# Patient Record
Sex: FEMALE | Race: WHITE | NOT HISPANIC OR LATINO | Employment: OTHER | URBAN - METROPOLITAN AREA
[De-identification: names, ages, dates, MRNs, and addresses within clinical notes are randomized per-mention and may not be internally consistent; named-entity substitution may affect disease eponyms.]

---

## 2017-11-09 ENCOUNTER — PATIENT OUTREACH (OUTPATIENT)
Dept: HEALTH INFORMATION MANAGEMENT | Facility: OTHER | Age: 67
End: 2017-11-09

## 2018-04-25 PROBLEM — E66.9 OBESITY (BMI 30-39.9): Status: ACTIVE | Noted: 2018-04-25

## 2020-09-11 ENCOUNTER — HOSPITAL ENCOUNTER (OUTPATIENT)
Dept: RADIOLOGY | Facility: MEDICAL CENTER | Age: 70
End: 2020-09-11

## 2021-01-01 ENCOUNTER — HOSPICE ADMISSION (OUTPATIENT)
Dept: HOSPICE | Facility: HOSPICE | Age: 71
End: 2021-01-01
Payer: MEDICARE

## 2021-01-01 ENCOUNTER — HOSPITAL ENCOUNTER (INPATIENT)
Facility: MEDICAL CENTER | Age: 71
LOS: 4 days | DRG: 177 | End: 2021-09-23
Attending: STUDENT IN AN ORGANIZED HEALTH CARE EDUCATION/TRAINING PROGRAM | Admitting: HOSPITALIST
Payer: MEDICARE

## 2021-01-01 VITALS
OXYGEN SATURATION: 62 % | SYSTOLIC BLOOD PRESSURE: 87 MMHG | DIASTOLIC BLOOD PRESSURE: 49 MMHG | RESPIRATION RATE: 20 BRPM | WEIGHT: 149.91 LBS | BODY MASS INDEX: 26.56 KG/M2 | HEART RATE: 105 BPM | TEMPERATURE: 97.5 F | HEIGHT: 63 IN

## 2021-01-01 LAB
ALBUMIN SERPL BCP-MCNC: 1.6 G/DL (ref 3.2–4.9)
ALBUMIN SERPL BCP-MCNC: 2.5 G/DL (ref 3.2–4.9)
ALBUMIN/GLOB SERPL: 0.5 G/DL
ALBUMIN/GLOB SERPL: 0.8 G/DL
ALP SERPL-CCNC: 35 U/L (ref 30–99)
ALP SERPL-CCNC: 41 U/L (ref 30–99)
ALT SERPL-CCNC: 14 U/L (ref 2–50)
ALT SERPL-CCNC: 16 U/L (ref 2–50)
ANION GAP SERPL CALC-SCNC: 14 MMOL/L (ref 7–16)
ANION GAP SERPL CALC-SCNC: 15 MMOL/L (ref 7–16)
ANION GAP SERPL CALC-SCNC: 16 MMOL/L (ref 7–16)
ANION GAP SERPL CALC-SCNC: 16 MMOL/L (ref 7–16)
AST SERPL-CCNC: 23 U/L (ref 12–45)
AST SERPL-CCNC: 26 U/L (ref 12–45)
BASE EXCESS BLDA CALC-SCNC: -2 MMOL/L (ref -4–3)
BILIRUB SERPL-MCNC: 0.3 MG/DL (ref 0.1–1.5)
BILIRUB SERPL-MCNC: 0.5 MG/DL (ref 0.1–1.5)
BODY TEMPERATURE: ABNORMAL DEGREES
BUN SERPL-MCNC: 48 MG/DL (ref 8–22)
BUN SERPL-MCNC: 51 MG/DL (ref 8–22)
BUN SERPL-MCNC: 54 MG/DL (ref 8–22)
BUN SERPL-MCNC: 61 MG/DL (ref 8–22)
CA-I BLD ISE-SCNC: 1.21 MMOL/L (ref 1.1–1.3)
CALCIUM SERPL-MCNC: 7.3 MG/DL (ref 8.4–10.2)
CALCIUM SERPL-MCNC: 8.5 MG/DL (ref 8.4–10.2)
CALCIUM SERPL-MCNC: 8.5 MG/DL (ref 8.4–10.2)
CALCIUM SERPL-MCNC: 8.7 MG/DL (ref 8.4–10.2)
CHLORIDE SERPL-SCNC: 102 MMOL/L (ref 96–112)
CHLORIDE SERPL-SCNC: 103 MMOL/L (ref 96–112)
CHLORIDE SERPL-SCNC: 107 MMOL/L (ref 96–112)
CHLORIDE SERPL-SCNC: 108 MMOL/L (ref 96–112)
CO2 BLDA-SCNC: 21 MMOL/L (ref 20–33)
CO2 SERPL-SCNC: 14 MMOL/L (ref 20–33)
CO2 SERPL-SCNC: 18 MMOL/L (ref 20–33)
CO2 SERPL-SCNC: 19 MMOL/L (ref 20–33)
CO2 SERPL-SCNC: 20 MMOL/L (ref 20–33)
CREAT SERPL-MCNC: 1 MG/DL (ref 0.5–1.4)
CREAT SERPL-MCNC: 1.05 MG/DL (ref 0.5–1.4)
CREAT SERPL-MCNC: 1.21 MG/DL (ref 0.5–1.4)
CREAT SERPL-MCNC: 1.7 MG/DL (ref 0.5–1.4)
CRP SERPL HS-MCNC: 14.09 MG/DL (ref 0–0.75)
D DIMER PPP IA.FEU-MCNC: >20 UG/ML (FEU) (ref 0–0.5)
ERYTHROCYTE [DISTWIDTH] IN BLOOD BY AUTOMATED COUNT: 48.3 FL (ref 35.9–50)
GLOBULIN SER CALC-MCNC: 3.1 G/DL (ref 1.9–3.5)
GLOBULIN SER CALC-MCNC: 3.2 G/DL (ref 1.9–3.5)
GLUCOSE BLD-MCNC: 108 MG/DL (ref 65–99)
GLUCOSE BLD-MCNC: 124 MG/DL (ref 65–99)
GLUCOSE BLD-MCNC: 136 MG/DL (ref 65–99)
GLUCOSE BLD-MCNC: 141 MG/DL (ref 65–99)
GLUCOSE BLD-MCNC: 156 MG/DL (ref 65–99)
GLUCOSE BLD-MCNC: 158 MG/DL (ref 65–99)
GLUCOSE BLD-MCNC: 159 MG/DL (ref 65–99)
GLUCOSE BLD-MCNC: 167 MG/DL (ref 65–99)
GLUCOSE BLD-MCNC: 177 MG/DL (ref 65–99)
GLUCOSE BLD-MCNC: 180 MG/DL (ref 65–99)
GLUCOSE BLD-MCNC: 184 MG/DL (ref 65–99)
GLUCOSE BLD-MCNC: 185 MG/DL (ref 65–99)
GLUCOSE BLD-MCNC: 217 MG/DL (ref 65–99)
GLUCOSE BLD-MCNC: 235 MG/DL (ref 65–99)
GLUCOSE BLD-MCNC: 256 MG/DL (ref 65–99)
GLUCOSE BLD-MCNC: 284 MG/DL (ref 65–99)
GLUCOSE BLD-MCNC: 287 MG/DL (ref 65–99)
GLUCOSE BLD-MCNC: 320 MG/DL (ref 65–99)
GLUCOSE BLD-MCNC: 62 MG/DL (ref 65–99)
GLUCOSE BLD-MCNC: 68 MG/DL (ref 65–99)
GLUCOSE BLD-MCNC: 71 MG/DL (ref 65–99)
GLUCOSE BLD-MCNC: 75 MG/DL (ref 65–99)
GLUCOSE BLD-MCNC: 87 MG/DL (ref 65–99)
GLUCOSE BLD-MCNC: 95 MG/DL (ref 65–99)
GLUCOSE SERPL-MCNC: 137 MG/DL (ref 65–99)
GLUCOSE SERPL-MCNC: 162 MG/DL (ref 65–99)
GLUCOSE SERPL-MCNC: 325 MG/DL (ref 65–99)
GLUCOSE SERPL-MCNC: 55 MG/DL (ref 65–99)
HCO3 BLDA-SCNC: 20.5 MMOL/L (ref 17–25)
HCT VFR BLD AUTO: 38.1 % (ref 37–47)
HCT VFR BLD CALC: 37 % (ref 37–47)
HGB BLD-MCNC: 12.2 G/DL (ref 12–16)
HGB BLD-MCNC: 12.6 G/DL (ref 12–16)
MCH RBC QN AUTO: 29.5 PG (ref 27–33)
MCHC RBC AUTO-ENTMCNC: 32 G/DL (ref 33.6–35)
MCV RBC AUTO: 92 FL (ref 81.4–97.8)
PCO2 BLDA: 29 MMHG (ref 26–37)
PH BLDA: 7.46 [PH] (ref 7.4–7.5)
PH TEMP ADJ BLDA: 7.47 [PH] (ref 7.4–7.5)
PLATELET # BLD AUTO: 132 K/UL (ref 164–446)
PMV BLD AUTO: 10.5 FL (ref 9–12.9)
PO2 BLDA: 52 MMHG (ref 64–87)
PO2 TEMP ADJ BLDA: 50 MMHG (ref 64–87)
POTASSIUM BLD-SCNC: 3.9 MMOL/L (ref 3.6–5.5)
POTASSIUM SERPL-SCNC: 4 MMOL/L (ref 3.6–5.5)
POTASSIUM SERPL-SCNC: 4.4 MMOL/L (ref 3.6–5.5)
POTASSIUM SERPL-SCNC: 4.9 MMOL/L (ref 3.6–5.5)
POTASSIUM SERPL-SCNC: 5.2 MMOL/L (ref 3.6–5.5)
PROCALCITONIN SERPL-MCNC: 0.09 NG/ML
PROCALCITONIN SERPL-MCNC: 0.1 NG/ML
PROT SERPL-MCNC: 4.7 G/DL (ref 6–8.2)
PROT SERPL-MCNC: 5.7 G/DL (ref 6–8.2)
RBC # BLD AUTO: 4.14 M/UL (ref 4.2–5.4)
SAO2 % BLDA: 89 % (ref 93–99)
SODIUM BLD-SCNC: 139 MMOL/L (ref 135–145)
SODIUM SERPL-SCNC: 135 MMOL/L (ref 135–145)
SODIUM SERPL-SCNC: 137 MMOL/L (ref 135–145)
SODIUM SERPL-SCNC: 138 MMOL/L (ref 135–145)
SODIUM SERPL-SCNC: 142 MMOL/L (ref 135–145)
SPECIMEN DRAWN FROM PATIENT: ABNORMAL
WBC # BLD AUTO: 5.4 K/UL (ref 4.8–10.8)

## 2021-01-01 PROCEDURE — 700102 HCHG RX REV CODE 250 W/ 637 OVERRIDE(OP): Performed by: HOSPITALIST

## 2021-01-01 PROCEDURE — 700111 HCHG RX REV CODE 636 W/ 250 OVERRIDE (IP): Performed by: STUDENT IN AN ORGANIZED HEALTH CARE EDUCATION/TRAINING PROGRAM

## 2021-01-01 PROCEDURE — 700102 HCHG RX REV CODE 250 W/ 637 OVERRIDE(OP): Performed by: STUDENT IN AN ORGANIZED HEALTH CARE EDUCATION/TRAINING PROGRAM

## 2021-01-01 PROCEDURE — 94669 MECHANICAL CHEST WALL OSCILL: CPT

## 2021-01-01 PROCEDURE — 80048 BASIC METABOLIC PNL TOTAL CA: CPT

## 2021-01-01 PROCEDURE — A9270 NON-COVERED ITEM OR SERVICE: HCPCS | Performed by: STUDENT IN AN ORGANIZED HEALTH CARE EDUCATION/TRAINING PROGRAM

## 2021-01-01 PROCEDURE — 94660 CPAP INITIATION&MGMT: CPT

## 2021-01-01 PROCEDURE — 99223 1ST HOSP IP/OBS HIGH 75: CPT | Performed by: HOSPITALIST

## 2021-01-01 PROCEDURE — 700111 HCHG RX REV CODE 636 W/ 250 OVERRIDE (IP): Performed by: HOSPITALIST

## 2021-01-01 PROCEDURE — 80053 COMPREHEN METABOLIC PANEL: CPT

## 2021-01-01 PROCEDURE — 94760 N-INVAS EAR/PLS OXIMETRY 1: CPT

## 2021-01-01 PROCEDURE — 86140 C-REACTIVE PROTEIN: CPT

## 2021-01-01 PROCEDURE — 99291 CRITICAL CARE FIRST HOUR: CPT | Performed by: HOSPITALIST

## 2021-01-01 PROCEDURE — 94640 AIRWAY INHALATION TREATMENT: CPT

## 2021-01-01 PROCEDURE — 85027 COMPLETE CBC AUTOMATED: CPT

## 2021-01-01 PROCEDURE — 36600 WITHDRAWAL OF ARTERIAL BLOOD: CPT

## 2021-01-01 PROCEDURE — 700101 HCHG RX REV CODE 250: Performed by: STUDENT IN AN ORGANIZED HEALTH CARE EDUCATION/TRAINING PROGRAM

## 2021-01-01 PROCEDURE — 770001 HCHG ROOM/CARE - MED/SURG/GYN PRIV*

## 2021-01-01 PROCEDURE — 700101 HCHG RX REV CODE 250: Performed by: INTERNAL MEDICINE

## 2021-01-01 PROCEDURE — 700101 HCHG RX REV CODE 250: Performed by: HOSPITALIST

## 2021-01-01 PROCEDURE — A9270 NON-COVERED ITEM OR SERVICE: HCPCS | Performed by: HOSPITALIST

## 2021-01-01 PROCEDURE — 82962 GLUCOSE BLOOD TEST: CPT

## 2021-01-01 PROCEDURE — 99291 CRITICAL CARE FIRST HOUR: CPT | Performed by: STUDENT IN AN ORGANIZED HEALTH CARE EDUCATION/TRAINING PROGRAM

## 2021-01-01 PROCEDURE — 700105 HCHG RX REV CODE 258: Performed by: INTERNAL MEDICINE

## 2021-01-01 PROCEDURE — 84295 ASSAY OF SERUM SODIUM: CPT

## 2021-01-01 PROCEDURE — 82962 GLUCOSE BLOOD TEST: CPT | Mod: 91

## 2021-01-01 PROCEDURE — 85379 FIBRIN DEGRADATION QUANT: CPT

## 2021-01-01 PROCEDURE — XW033E5 INTRODUCTION OF REMDESIVIR ANTI-INFECTIVE INTO PERIPHERAL VEIN, PERCUTANEOUS APPROACH, NEW TECHNOLOGY GROUP 5: ICD-10-PCS | Performed by: INTERNAL MEDICINE

## 2021-01-01 PROCEDURE — 97165 OT EVAL LOW COMPLEX 30 MIN: CPT

## 2021-01-01 PROCEDURE — 84132 ASSAY OF SERUM POTASSIUM: CPT

## 2021-01-01 PROCEDURE — 82330 ASSAY OF CALCIUM: CPT

## 2021-01-01 PROCEDURE — 82803 BLOOD GASES ANY COMBINATION: CPT

## 2021-01-01 PROCEDURE — 8E0ZXY6 ISOLATION: ICD-10-PCS | Performed by: HOSPITALIST

## 2021-01-01 PROCEDURE — 84145 PROCALCITONIN (PCT): CPT

## 2021-01-01 PROCEDURE — 5A0945A ASSISTANCE WITH RESPIRATORY VENTILATION, 24-96 CONSECUTIVE HOURS, HIGH NASAL FLOW/VELOCITY: ICD-10-PCS | Performed by: HOSPITALIST

## 2021-01-01 PROCEDURE — 99233 SBSQ HOSP IP/OBS HIGH 50: CPT | Performed by: STUDENT IN AN ORGANIZED HEALTH CARE EDUCATION/TRAINING PROGRAM

## 2021-01-01 PROCEDURE — 85014 HEMATOCRIT: CPT

## 2021-01-01 RX ORDER — POLYETHYLENE GLYCOL 3350 17 G/17G
1 POWDER, FOR SOLUTION ORAL
Status: DISCONTINUED | OUTPATIENT
Start: 2021-01-01 | End: 2021-01-01

## 2021-01-01 RX ORDER — DEXTROSE MONOHYDRATE 25 G/50ML
50 INJECTION, SOLUTION INTRAVENOUS
Status: DISCONTINUED | OUTPATIENT
Start: 2021-01-01 | End: 2021-01-01

## 2021-01-01 RX ORDER — AMOXICILLIN 250 MG
2 CAPSULE ORAL 2 TIMES DAILY
Status: DISCONTINUED | OUTPATIENT
Start: 2021-01-01 | End: 2021-01-01

## 2021-01-01 RX ORDER — ATORVASTATIN CALCIUM 10 MG/1
10 TABLET, FILM COATED ORAL EVERY EVENING
Status: DISCONTINUED | OUTPATIENT
Start: 2021-01-01 | End: 2021-01-01

## 2021-01-01 RX ORDER — LIDOCAINE 50 MG/G
1 PATCH TOPICAL EVERY 24 HOURS
Status: DISCONTINUED | OUTPATIENT
Start: 2021-01-01 | End: 2021-01-01

## 2021-01-01 RX ORDER — ATORVASTATIN CALCIUM 10 MG/1
10 TABLET, FILM COATED ORAL
Status: DISCONTINUED | OUTPATIENT
Start: 2021-01-01 | End: 2021-01-01

## 2021-01-01 RX ORDER — LISINOPRIL 5 MG/1
5 TABLET ORAL
Status: DISCONTINUED | OUTPATIENT
Start: 2021-01-01 | End: 2021-01-01

## 2021-01-01 RX ORDER — DEXAMETHASONE 4 MG/1
6 TABLET ORAL DAILY
Status: DISCONTINUED | OUTPATIENT
Start: 2021-01-01 | End: 2021-01-01

## 2021-01-01 RX ORDER — LORAZEPAM 2 MG/ML
1 INJECTION INTRAMUSCULAR
Status: DISCONTINUED | OUTPATIENT
Start: 2021-01-01 | End: 2021-09-24 | Stop reason: HOSPADM

## 2021-01-01 RX ORDER — MORPHINE SULFATE 10 MG/ML
5 INJECTION, SOLUTION INTRAMUSCULAR; INTRAVENOUS
Status: DISCONTINUED | OUTPATIENT
Start: 2021-01-01 | End: 2021-09-24 | Stop reason: HOSPADM

## 2021-01-01 RX ORDER — OXYCODONE HYDROCHLORIDE AND ACETAMINOPHEN 5; 325 MG/1; MG/1
2 TABLET ORAL EVERY 6 HOURS PRN
Status: DISCONTINUED | OUTPATIENT
Start: 2021-01-01 | End: 2021-01-01

## 2021-01-01 RX ORDER — LEVOTHYROXINE SODIUM 112 UG/1
112 TABLET ORAL
Status: DISCONTINUED | OUTPATIENT
Start: 2021-01-01 | End: 2021-01-01

## 2021-01-01 RX ORDER — OMEPRAZOLE 20 MG/1
20 CAPSULE, DELAYED RELEASE ORAL DAILY
Status: DISCONTINUED | OUTPATIENT
Start: 2021-01-01 | End: 2021-01-01

## 2021-01-01 RX ORDER — ACETAMINOPHEN 325 MG/1
650 TABLET ORAL EVERY 4 HOURS PRN
Status: DISCONTINUED | OUTPATIENT
Start: 2021-01-01 | End: 2021-09-24 | Stop reason: HOSPADM

## 2021-01-01 RX ORDER — GABAPENTIN 100 MG/1
100 CAPSULE ORAL 3 TIMES DAILY PRN
COMMUNITY

## 2021-01-01 RX ORDER — ACETAMINOPHEN 650 MG/1
650 SUPPOSITORY RECTAL EVERY 4 HOURS PRN
Status: DISCONTINUED | OUTPATIENT
Start: 2021-01-01 | End: 2021-09-24 | Stop reason: HOSPADM

## 2021-01-01 RX ORDER — BISACODYL 10 MG
10 SUPPOSITORY, RECTAL RECTAL
Status: DISCONTINUED | OUTPATIENT
Start: 2021-01-01 | End: 2021-01-01

## 2021-01-01 RX ORDER — DOCUSATE SODIUM 100 MG/1
100 CAPSULE, LIQUID FILLED ORAL EVERY 12 HOURS
Status: DISCONTINUED | OUTPATIENT
Start: 2021-01-01 | End: 2021-09-24 | Stop reason: HOSPADM

## 2021-01-01 RX ORDER — OXYCODONE AND ACETAMINOPHEN 10; 325 MG/1; MG/1
1 TABLET ORAL EVERY 4 HOURS PRN
Status: ON HOLD | COMMUNITY
End: 2021-01-01

## 2021-01-01 RX ORDER — LORAZEPAM 2 MG/ML
1 CONCENTRATE ORAL
Status: DISCONTINUED | OUTPATIENT
Start: 2021-01-01 | End: 2021-09-24 | Stop reason: HOSPADM

## 2021-01-01 RX ORDER — OXYCODONE HYDROCHLORIDE 10 MG/1
10 TABLET ORAL 2 TIMES DAILY PRN
COMMUNITY

## 2021-01-01 RX ORDER — MORPHINE SULFATE 10 MG/ML
10 INJECTION, SOLUTION INTRAMUSCULAR; INTRAVENOUS
Status: DISCONTINUED | OUTPATIENT
Start: 2021-01-01 | End: 2021-09-24 | Stop reason: HOSPADM

## 2021-01-01 RX ORDER — SCOLOPAMINE TRANSDERMAL SYSTEM 1 MG/1
1 PATCH, EXTENDED RELEASE TRANSDERMAL
Status: DISCONTINUED | OUTPATIENT
Start: 2021-01-01 | End: 2021-09-24 | Stop reason: HOSPADM

## 2021-01-01 RX ORDER — TACROLIMUS 1 MG/1
2 CAPSULE ORAL EVERY MORNING
Status: DISCONTINUED | OUTPATIENT
Start: 2021-01-01 | End: 2021-01-01

## 2021-01-01 RX ORDER — ONDANSETRON 2 MG/ML
4 INJECTION INTRAMUSCULAR; INTRAVENOUS EVERY 4 HOURS PRN
Status: DISCONTINUED | OUTPATIENT
Start: 2021-01-01 | End: 2021-01-01

## 2021-01-01 RX ORDER — ONDANSETRON 2 MG/ML
8 INJECTION INTRAMUSCULAR; INTRAVENOUS EVERY 8 HOURS PRN
Status: DISCONTINUED | OUTPATIENT
Start: 2021-01-01 | End: 2021-09-24 | Stop reason: HOSPADM

## 2021-01-01 RX ORDER — COVID-19 ANTIGEN TEST
220 KIT MISCELLANEOUS DAILY
COMMUNITY

## 2021-01-01 RX ORDER — MYCOPHENOLATE MOFETIL 250 MG/1
500 CAPSULE ORAL 2 TIMES DAILY
Status: DISCONTINUED | OUTPATIENT
Start: 2021-01-01 | End: 2021-01-01

## 2021-01-01 RX ORDER — TACROLIMUS 1 MG/1
1 CAPSULE ORAL EVERY EVENING
Status: DISCONTINUED | OUTPATIENT
Start: 2021-01-01 | End: 2021-01-01

## 2021-01-01 RX ORDER — ATROPINE SULFATE 10 MG/ML
2 SOLUTION/ DROPS OPHTHALMIC EVERY 4 HOURS PRN
Status: DISCONTINUED | OUTPATIENT
Start: 2021-01-01 | End: 2021-09-24 | Stop reason: HOSPADM

## 2021-01-01 RX ORDER — FUROSEMIDE 10 MG/ML
20 INJECTION INTRAMUSCULAR; INTRAVENOUS
Status: DISCONTINUED | OUTPATIENT
Start: 2021-01-01 | End: 2021-01-01

## 2021-01-01 RX ORDER — POLYVINYL ALCOHOL 14 MG/ML
2 SOLUTION/ DROPS OPHTHALMIC EVERY 6 HOURS PRN
Status: DISCONTINUED | OUTPATIENT
Start: 2021-01-01 | End: 2021-09-24 | Stop reason: HOSPADM

## 2021-01-01 RX ORDER — ONDANSETRON 4 MG/1
4 TABLET, ORALLY DISINTEGRATING ORAL EVERY 4 HOURS PRN
Status: DISCONTINUED | OUTPATIENT
Start: 2021-01-01 | End: 2021-01-01

## 2021-01-01 RX ORDER — ONDANSETRON 4 MG/1
8 TABLET, ORALLY DISINTEGRATING ORAL EVERY 8 HOURS PRN
Status: DISCONTINUED | OUTPATIENT
Start: 2021-01-01 | End: 2021-09-24 | Stop reason: HOSPADM

## 2021-01-01 RX ORDER — LISINOPRIL 5 MG/1
2.5 TABLET ORAL
Status: DISCONTINUED | OUTPATIENT
Start: 2021-01-01 | End: 2021-01-01

## 2021-01-01 RX ORDER — ACETAMINOPHEN 325 MG/1
650 TABLET ORAL EVERY 6 HOURS PRN
Status: DISCONTINUED | OUTPATIENT
Start: 2021-01-01 | End: 2021-01-01

## 2021-01-01 RX ORDER — TACROLIMUS 1 MG/1
1 CAPSULE ORAL 2 TIMES DAILY
Status: DISCONTINUED | OUTPATIENT
Start: 2021-01-01 | End: 2021-01-01

## 2021-01-01 RX ORDER — PREDNISONE 5 MG/1
5 TABLET ORAL EVERY EVENING
COMMUNITY

## 2021-01-01 RX ADMIN — OMEPRAZOLE 20 MG: 20 CAPSULE, DELAYED RELEASE ORAL at 05:00

## 2021-01-01 RX ADMIN — INSULIN HUMAN 7 UNITS: 100 INJECTION, SOLUTION PARENTERAL at 12:22

## 2021-01-01 RX ADMIN — MYCOPHENOLATE MOFETIL 500 MG: 250 CAPSULE ORAL at 06:07

## 2021-01-01 RX ADMIN — OXYCODONE AND ACETAMINOPHEN 2 TABLET: 5; 325 TABLET ORAL at 04:26

## 2021-01-01 RX ADMIN — INSULIN HUMAN 3 UNITS: 100 INJECTION, SOLUTION PARENTERAL at 17:33

## 2021-01-01 RX ADMIN — FUROSEMIDE 20 MG: 10 INJECTION, SOLUTION INTRAMUSCULAR; INTRAVENOUS at 06:12

## 2021-01-01 RX ADMIN — TACROLIMUS 1 MG: 1 CAPSULE ORAL at 18:06

## 2021-01-01 RX ADMIN — OXYCODONE AND ACETAMINOPHEN 2 TABLET: 5; 325 TABLET ORAL at 04:58

## 2021-01-01 RX ADMIN — ATORVASTATIN CALCIUM 10 MG: 10 TABLET, FILM COATED ORAL at 18:06

## 2021-01-01 RX ADMIN — REMDESIVIR 200 MG: 100 INJECTION, POWDER, LYOPHILIZED, FOR SOLUTION INTRAVENOUS at 15:58

## 2021-01-01 RX ADMIN — APIXABAN 5 MG: 5 TABLET, FILM COATED ORAL at 18:03

## 2021-01-01 RX ADMIN — FUROSEMIDE 20 MG: 10 INJECTION, SOLUTION INTRAMUSCULAR; INTRAVENOUS at 05:00

## 2021-01-01 RX ADMIN — APIXABAN 5 MG: 5 TABLET, FILM COATED ORAL at 05:33

## 2021-01-01 RX ADMIN — INSULIN HUMAN 4 UNITS: 100 INJECTION, SOLUTION PARENTERAL at 21:01

## 2021-01-01 RX ADMIN — DEXTROSE MONOHYDRATE 50 ML: 25 INJECTION, SOLUTION INTRAVENOUS at 06:44

## 2021-01-01 RX ADMIN — LORAZEPAM 1 MG: 2 INJECTION INTRAMUSCULAR; INTRAVENOUS at 16:31

## 2021-01-01 RX ADMIN — INSULIN HUMAN 7 UNITS: 100 INJECTION, SOLUTION PARENTERAL at 12:20

## 2021-01-01 RX ADMIN — TACROLIMUS 1 MG: 1 CAPSULE ORAL at 18:05

## 2021-01-01 RX ADMIN — SCOPOLAMINE 1 PATCH: 1.5 PATCH, EXTENDED RELEASE TRANSDERMAL at 16:32

## 2021-01-01 RX ADMIN — OMEPRAZOLE 20 MG: 20 CAPSULE, DELAYED RELEASE ORAL at 05:33

## 2021-01-01 RX ADMIN — TACROLIMUS 2 MG: 1 CAPSULE ORAL at 05:33

## 2021-01-01 RX ADMIN — DEXTROSE MONOHYDRATE 50 ML: 25 INJECTION, SOLUTION INTRAVENOUS at 06:01

## 2021-01-01 RX ADMIN — OXYCODONE AND ACETAMINOPHEN 2 TABLET: 5; 325 TABLET ORAL at 18:04

## 2021-01-01 RX ADMIN — APIXABAN 5 MG: 5 TABLET, FILM COATED ORAL at 06:07

## 2021-01-01 RX ADMIN — LEVOTHYROXINE SODIUM 112 MCG: 0.11 TABLET ORAL at 05:34

## 2021-01-01 RX ADMIN — MYCOPHENOLATE MOFETIL 500 MG: 250 CAPSULE ORAL at 06:13

## 2021-01-01 RX ADMIN — ATORVASTATIN CALCIUM 10 MG: 10 TABLET, FILM COATED ORAL at 18:05

## 2021-01-01 RX ADMIN — OXYCODONE AND ACETAMINOPHEN 2 TABLET: 5; 325 TABLET ORAL at 12:48

## 2021-01-01 RX ADMIN — ONDANSETRON 4 MG: 2 INJECTION INTRAMUSCULAR; INTRAVENOUS at 12:22

## 2021-01-01 RX ADMIN — LIDOCAINE 1 PATCH: 50 PATCH TOPICAL at 03:30

## 2021-01-01 RX ADMIN — INSULIN HUMAN 3 UNITS: 100 INJECTION, SOLUTION PARENTERAL at 06:31

## 2021-01-01 RX ADMIN — MYCOPHENOLATE MOFETIL 500 MG: 250 CAPSULE ORAL at 18:04

## 2021-01-01 RX ADMIN — OXYCODONE AND ACETAMINOPHEN 2 TABLET: 5; 325 TABLET ORAL at 14:09

## 2021-01-01 RX ADMIN — INSULIN HUMAN 3 UNITS: 100 INJECTION, SOLUTION PARENTERAL at 06:10

## 2021-01-01 RX ADMIN — FUROSEMIDE 20 MG: 10 INJECTION, SOLUTION INTRAMUSCULAR; INTRAVENOUS at 18:04

## 2021-01-01 RX ADMIN — TACROLIMUS 2 MG: 1 CAPSULE ORAL at 05:00

## 2021-01-01 RX ADMIN — TACROLIMUS 1 MG: 1 CAPSULE ORAL at 17:26

## 2021-01-01 RX ADMIN — LISINOPRIL 5 MG: 5 TABLET ORAL at 10:43

## 2021-01-01 RX ADMIN — TACROLIMUS 2 MG: 1 CAPSULE ORAL at 06:13

## 2021-01-01 RX ADMIN — LEVOTHYROXINE SODIUM 112 MCG: 0.11 TABLET ORAL at 06:07

## 2021-01-01 RX ADMIN — LEVOTHYROXINE SODIUM 112 MCG: 0.11 TABLET ORAL at 06:14

## 2021-01-01 RX ADMIN — APIXABAN 5 MG: 5 TABLET, FILM COATED ORAL at 18:06

## 2021-01-01 RX ADMIN — ATORVASTATIN CALCIUM 10 MG: 10 TABLET, FILM COATED ORAL at 17:26

## 2021-01-01 RX ADMIN — OXYCODONE AND ACETAMINOPHEN 2 TABLET: 5; 325 TABLET ORAL at 03:03

## 2021-01-01 RX ADMIN — LIDOCAINE 1 PATCH: 50 PATCH TOPICAL at 03:03

## 2021-01-01 RX ADMIN — LIDOCAINE 1 PATCH: 50 PATCH TOPICAL at 04:59

## 2021-01-01 RX ADMIN — ATORVASTATIN CALCIUM 10 MG: 10 TABLET, FILM COATED ORAL at 18:03

## 2021-01-01 RX ADMIN — FUROSEMIDE 20 MG: 10 INJECTION, SOLUTION INTRAMUSCULAR; INTRAVENOUS at 15:58

## 2021-01-01 RX ADMIN — MYCOPHENOLATE MOFETIL 500 MG: 250 CAPSULE ORAL at 17:26

## 2021-01-01 RX ADMIN — LIDOCAINE 1 PATCH: 50 PATCH TOPICAL at 04:34

## 2021-01-01 RX ADMIN — LEVOTHYROXINE SODIUM 112 MCG: 0.11 TABLET ORAL at 10:43

## 2021-01-01 RX ADMIN — OMEPRAZOLE 20 MG: 20 CAPSULE, DELAYED RELEASE ORAL at 06:07

## 2021-01-01 RX ADMIN — INSULIN HUMAN 3 UNITS: 100 INJECTION, SOLUTION PARENTERAL at 11:49

## 2021-01-01 RX ADMIN — MYCOPHENOLATE MOFETIL 500 MG: 250 CAPSULE ORAL at 06:08

## 2021-01-01 RX ADMIN — OXYCODONE AND ACETAMINOPHEN 2 TABLET: 5; 325 TABLET ORAL at 08:17

## 2021-01-01 RX ADMIN — OXYCODONE AND ACETAMINOPHEN 2 TABLET: 5; 325 TABLET ORAL at 00:54

## 2021-01-01 RX ADMIN — SENNOSIDES AND DOCUSATE SODIUM 2 TABLET: 50; 8.6 TABLET ORAL at 06:06

## 2021-01-01 RX ADMIN — APIXABAN 5 MG: 5 TABLET, FILM COATED ORAL at 06:13

## 2021-01-01 RX ADMIN — APIXABAN 5 MG: 5 TABLET, FILM COATED ORAL at 10:43

## 2021-01-01 RX ADMIN — FUROSEMIDE 20 MG: 10 INJECTION, SOLUTION INTRAMUSCULAR; INTRAVENOUS at 10:43

## 2021-01-01 RX ADMIN — MORPHINE SULFATE 10 MG: 10 INJECTION INTRAVENOUS at 16:31

## 2021-01-01 RX ADMIN — TACROLIMUS 1 MG: 1 CAPSULE ORAL at 06:08

## 2021-01-01 RX ADMIN — FUROSEMIDE 20 MG: 10 INJECTION, SOLUTION INTRAMUSCULAR; INTRAVENOUS at 16:53

## 2021-01-01 RX ADMIN — INSULIN HUMAN 3 UNITS: 100 INJECTION, SOLUTION PARENTERAL at 11:42

## 2021-01-01 RX ADMIN — MYCOPHENOLATE MOFETIL 500 MG: 250 CAPSULE ORAL at 05:00

## 2021-01-01 RX ADMIN — MYCOPHENOLATE MOFETIL 500 MG: 250 CAPSULE ORAL at 18:05

## 2021-01-01 RX ADMIN — OMEPRAZOLE 20 MG: 20 CAPSULE, DELAYED RELEASE ORAL at 11:39

## 2021-01-01 RX ADMIN — LEVOTHYROXINE SODIUM 112 MCG: 0.11 TABLET ORAL at 05:00

## 2021-01-01 RX ADMIN — APIXABAN 5 MG: 5 TABLET, FILM COATED ORAL at 05:00

## 2021-01-01 RX ADMIN — MYCOPHENOLATE MOFETIL 500 MG: 250 CAPSULE ORAL at 18:03

## 2021-01-01 RX ADMIN — FUROSEMIDE 20 MG: 10 INJECTION, SOLUTION INTRAMUSCULAR; INTRAVENOUS at 18:03

## 2021-01-01 RX ADMIN — LIDOCAINE 1 PATCH: 50 PATCH TOPICAL at 04:26

## 2021-01-01 RX ADMIN — APIXABAN 5 MG: 5 TABLET, FILM COATED ORAL at 17:26

## 2021-01-01 RX ADMIN — INSULIN HUMAN 6 UNITS: 100 INJECTION, SOLUTION PARENTERAL at 06:08

## 2021-01-01 RX ADMIN — MYCOPHENOLATE MOFETIL 500 MG: 250 CAPSULE ORAL at 05:33

## 2021-01-01 RX ADMIN — TACROLIMUS 1 MG: 1 CAPSULE ORAL at 18:03

## 2021-01-01 RX ADMIN — TACROLIMUS 2 MG: 1 CAPSULE ORAL at 06:06

## 2021-01-01 ASSESSMENT — ENCOUNTER SYMPTOMS
CLAUDICATION: 0
VOMITING: 0
TINGLING: 0
FOCAL WEAKNESS: 0
DIAPHORESIS: 0
INSOMNIA: 1
HEMOPTYSIS: 0
SENSORY CHANGE: 0
WHEEZING: 0
DOUBLE VISION: 0
SHORTNESS OF BREATH: 1
HEARTBURN: 1
CHILLS: 0
DOUBLE VISION: 0
HEADACHES: 0
DIZZINESS: 0
PALPITATIONS: 0
VOMITING: 0
INSOMNIA: 0
SENSORY CHANGE: 0
DEPRESSION: 0
BLOOD IN STOOL: 0
MYALGIAS: 0
BLURRED VISION: 0
ABDOMINAL PAIN: 0
NAUSEA: 0
DEPRESSION: 0
COUGH: 1
PALPITATIONS: 0
TREMORS: 0
NERVOUS/ANXIOUS: 0
BLURRED VISION: 0
ABDOMINAL PAIN: 0
WEIGHT LOSS: 0
NAUSEA: 0
NAUSEA: 0
INSOMNIA: 0
NAUSEA: 0
NECK PAIN: 0
BACK PAIN: 1
FOCAL WEAKNESS: 0
INSOMNIA: 0
EYES NEGATIVE: 1
SHORTNESS OF BREATH: 0
GASTROINTESTINAL NEGATIVE: 1
VOMITING: 0
CONSTIPATION: 0
PALPITATIONS: 0
HEADACHES: 0
VOMITING: 0
ORTHOPNEA: 0
SHORTNESS OF BREATH: 1
COUGH: 1
NEUROLOGICAL NEGATIVE: 1
FEVER: 0
CARDIOVASCULAR NEGATIVE: 1
FEVER: 0
COUGH: 1
HEADACHES: 0
SHORTNESS OF BREATH: 1
SEIZURES: 0
DIZZINESS: 0
EYE PAIN: 0
STRIDOR: 0
HEARTBURN: 0
DOUBLE VISION: 0
FEVER: 0
NERVOUS/ANXIOUS: 1
SPUTUM PRODUCTION: 1
BRUISES/BLEEDS EASILY: 0
HEADACHES: 1
MEMORY LOSS: 0
MUSCULOSKELETAL NEGATIVE: 1
BLURRED VISION: 0
LOSS OF CONSCIOUSNESS: 0
EYES NEGATIVE: 1
SORE THROAT: 0
DIARRHEA: 0
DEPRESSION: 1
BACK PAIN: 0
CHILLS: 0
SPUTUM PRODUCTION: 0
WEAKNESS: 0
BACK PAIN: 0
BRUISES/BLEEDS EASILY: 0
COUGH: 1
DIZZINESS: 0

## 2021-01-01 ASSESSMENT — COGNITIVE AND FUNCTIONAL STATUS - GENERAL
HELP NEEDED FOR BATHING: A LOT
MOBILITY SCORE: 15
PERSONAL GROOMING: A LITTLE
SUGGESTED CMS G CODE MODIFIER MOBILITY: CK
MOVING TO AND FROM BED TO CHAIR: A LITTLE
DRESSING REGULAR LOWER BODY CLOTHING: A LOT
SUGGESTED CMS G CODE MODIFIER DAILY ACTIVITY: CK
HELP NEEDED FOR BATHING: A LITTLE
DRESSING REGULAR UPPER BODY CLOTHING: A LOT
PERSONAL GROOMING: A LITTLE
DAILY ACTIVITIY SCORE: 16
EATING MEALS: A LITTLE
TOILETING: A LOT
TOILETING: A LOT
CLIMB 3 TO 5 STEPS WITH RAILING: A LOT
TURNING FROM BACK TO SIDE WHILE IN FLAT BAD: A LITTLE
STANDING UP FROM CHAIR USING ARMS: A LOT
DAILY ACTIVITIY SCORE: 15
WALKING IN HOSPITAL ROOM: A LOT
EATING MEALS: A LITTLE
DRESSING REGULAR UPPER BODY CLOTHING: A LITTLE
SUGGESTED CMS G CODE MODIFIER DAILY ACTIVITY: CK
MOVING FROM LYING ON BACK TO SITTING ON SIDE OF FLAT BED: A LITTLE
DRESSING REGULAR LOWER BODY CLOTHING: A LITTLE

## 2021-01-01 ASSESSMENT — PAIN DESCRIPTION - PAIN TYPE
TYPE: CHRONIC PAIN
TYPE: ACUTE PAIN
TYPE: CHRONIC PAIN
TYPE: ACUTE PAIN
TYPE: CHRONIC PAIN
TYPE: ACUTE PAIN
TYPE: ACUTE PAIN
TYPE: ACUTE PAIN;CHRONIC PAIN
TYPE: CHRONIC PAIN
TYPE: ACUTE PAIN
TYPE: CHRONIC PAIN
TYPE: ACUTE PAIN
TYPE: CHRONIC PAIN

## 2021-01-01 ASSESSMENT — PATIENT HEALTH QUESTIONNAIRE - PHQ9
2. FEELING DOWN, DEPRESSED, IRRITABLE, OR HOPELESS: NOT AT ALL
SUM OF ALL RESPONSES TO PHQ9 QUESTIONS 1 AND 2: 0
1. LITTLE INTEREST OR PLEASURE IN DOING THINGS: NOT AT ALL

## 2021-01-01 ASSESSMENT — LIFESTYLE VARIABLES
EVER FELT BAD OR GUILTY ABOUT YOUR DRINKING: NO
SUBSTANCE_ABUSE: 0
TOTAL SCORE: 0
CONSUMPTION TOTAL: NEGATIVE
EVER HAD A DRINK FIRST THING IN THE MORNING TO STEADY YOUR NERVES TO GET RID OF A HANGOVER: NO
HOW MANY TIMES IN THE PAST YEAR HAVE YOU HAD 5 OR MORE DRINKS IN A DAY: 0
ON A TYPICAL DAY WHEN YOU DRINK ALCOHOL HOW MANY DRINKS DO YOU HAVE: 0
HAVE YOU EVER FELT YOU SHOULD CUT DOWN ON YOUR DRINKING: NO
HAVE PEOPLE ANNOYED YOU BY CRITICIZING YOUR DRINKING: NO
ALCOHOL_USE: NO
TOTAL SCORE: 0
TOTAL SCORE: 0
AVERAGE NUMBER OF DAYS PER WEEK YOU HAVE A DRINK CONTAINING ALCOHOL: 0

## 2021-01-01 ASSESSMENT — CHA2DS2 SCORE
CHF OR LEFT VENTRICULAR DYSFUNCTION: YES
VASCULAR DISEASE: NO
AGE 65 TO 74: YES
SEX: FEMALE
PRIOR STROKE OR TIA OR THROMBOEMBOLISM: NO
DIABETES: YES
AGE 75 OR GREATER: NO
CHA2DS2 VASC SCORE: 5
HYPERTENSION: YES

## 2021-01-01 ASSESSMENT — FIBROSIS 4 INDEX
FIB4 SCORE: 4.71
FIB4 SCORE: 3.35
FIB4 SCORE: 4.78

## 2021-01-01 ASSESSMENT — ACTIVITIES OF DAILY LIVING (ADL): TOILETING: INDEPENDENT

## 2021-01-07 PROBLEM — I27.20 PULMONARY HYPERTENSION (HCC): Status: ACTIVE | Noted: 2020-01-01

## 2021-01-07 PROBLEM — I44.7 LBBB (LEFT BUNDLE BRANCH BLOCK): Status: ACTIVE | Noted: 2020-01-01

## 2021-01-07 PROBLEM — Z87.828 HISTORY OF BURNS: Status: ACTIVE | Noted: 2020-01-01

## 2021-01-07 PROBLEM — H35.30 MACULAR DEGENERATION: Status: ACTIVE | Noted: 2020-01-01

## 2021-01-07 PROBLEM — E11.319 DIABETIC RETINOPATHY (HCC): Status: ACTIVE | Noted: 2020-01-01

## 2021-01-07 PROBLEM — E11.9 TYPE 2 DIABETES MELLITUS WITHOUT COMPLICATION (HCC): Status: ACTIVE | Noted: 2020-01-01

## 2021-01-07 PROBLEM — I42.8 NONISCHEMIC CARDIOMYOPATHY (HCC): Status: ACTIVE | Noted: 2020-01-01

## 2021-01-07 PROBLEM — I10 HYPERTENSION: Status: ACTIVE | Noted: 2020-01-01

## 2021-01-07 PROBLEM — I48.0 PAROXYSMAL ATRIAL FIBRILLATION (HCC): Status: ACTIVE | Noted: 2020-01-01

## 2021-01-07 PROBLEM — M51.36 DEGENERATIVE DISC DISEASE, LUMBAR: Status: ACTIVE | Noted: 2020-01-01

## 2021-01-07 PROBLEM — G47.33 OBSTRUCTIVE SLEEP APNEA: Status: ACTIVE | Noted: 2020-01-01

## 2021-01-07 PROBLEM — Z79.01 CHRONIC ANTICOAGULATION: Status: ACTIVE | Noted: 2020-01-01

## 2021-01-07 PROBLEM — E78.5 HYPERLIPIDEMIA: Status: ACTIVE | Noted: 2020-01-01

## 2021-07-09 PROBLEM — E11.9 TYPE 2 DIABETES MELLITUS WITHOUT COMPLICATION (HCC): Status: ACTIVE | Noted: 2018-07-11

## 2021-09-18 PROBLEM — E11.10 DIABETIC KETOACIDOSIS ASSOCIATED WITH TYPE 2 DIABETES MELLITUS (HCC): Status: ACTIVE | Noted: 2021-01-01

## 2021-09-18 PROBLEM — J12.82 PNEUMONIA DUE TO COVID-19 VIRUS: Status: ACTIVE | Noted: 2021-01-01

## 2021-09-18 PROBLEM — U07.1 COVID-19: Status: ACTIVE | Noted: 2021-01-01

## 2021-09-18 PROBLEM — U07.1 COVID-19: Status: RESOLVED | Noted: 2021-01-01 | Resolved: 2021-01-01

## 2021-09-19 PROBLEM — I50.23 ACUTE ON CHRONIC SYSTOLIC HEART FAILURE (HCC): Status: ACTIVE | Noted: 2021-01-01

## 2021-09-19 PROBLEM — I50.23 ACUTE ON CHRONIC SYSTOLIC HEART FAILURE (HCC): Status: RESOLVED | Noted: 2021-01-01 | Resolved: 2021-01-01

## 2021-09-19 PROBLEM — R79.89 POSITIVE D DIMER: Status: ACTIVE | Noted: 2021-01-01

## 2021-09-19 PROBLEM — J96.91 RESPIRATORY FAILURE WITH HYPOXIA (HCC): Status: ACTIVE | Noted: 2021-01-01

## 2021-09-19 NOTE — ASSESSMENT & PLAN NOTE
Low-fat low-cholesterol diet  Statin  Fasting lipid panel will need to be repeated for more current value.  Lab Results   Component Value Date/Time    CHOLSTRLTOT 141 12/31/2020 10:16 AM    LDL 46 12/31/2020 10:16 AM    HDL 81.0 (H) 12/31/2020 10:16 AM    TRIGLYCERIDE 69 12/31/2020 10:16 AM

## 2021-09-19 NOTE — ASSESSMENT & PLAN NOTE
Status post dexamethasone and baricitinib.  Inflammatory markers are downtrending. O2 requirements slowly improving.  -Decrease fio2 as tolerated down to simple mask/nasal cannula  -encourage proning  -f/u sputum cultures  -discontinue antibiotics - received a course at OSH and no e/o bacterial infection (neg procal)  -lasix 20mg BID for likely component of fluid overload with elevated BNP

## 2021-09-19 NOTE — H&P
Hospital Medicine History & Physical Note    Date of Service  9/19/2021    Primary Care Physician  Roger Reese M.D.    Consultants  None    Code Status  Full Code    Chief Complaint  Direct admission coming from [facility] - COVID Pneumonia on BiPAP    History of Presenting Illness  Jennifer Ferrell is a 70 y.o. female who  Is coming as a Direct Admission from [facility] to our ICU on 9/19/21.  Patient tested positive for Covid on 9/10/2021, symptomatic since 9/7.  She has history of type 2 diabetes, CHF, A. fib on Eliquis, history of kidney transplant on immunosuppressive therapy, tacrolimus and CellCept.  She was admitted at was admitted on 9/12/2021 with DKA, that later improved.  Old ongoing needs of oxygen, initially heated high flow and since day 2 of admission was transferred to ICU for BiPAP given worsening hypoxia, which has been used intermittently.  She received Decadron, empiric antibiotics in baricitinib.  Patient has been awake and alert and at the time of admission has no complaints.  She was transferred here due to lack of RT availability services over at [facility] this evening.    I discussed the plan of care with patient.    Review of Systems  Review of Systems   Constitutional: Positive for malaise/fatigue. Negative for fever.   HENT: Negative for congestion and sore throat.    Eyes: Negative for blurred vision and double vision.   Respiratory: Positive for cough and shortness of breath.    Cardiovascular: Negative for chest pain and palpitations.   Gastrointestinal: Negative for nausea and vomiting.   Genitourinary: Negative for dysuria and urgency.   Musculoskeletal: Negative for myalgias and neck pain.   Skin: Negative for itching and rash.   Neurological: Negative for dizziness, weakness and headaches.   Endo/Heme/Allergies: Does not bruise/bleed easily.   Psychiatric/Behavioral: Negative for depression. The patient does not have insomnia.        Past  Medical History   has a past medical history of Allergy, unspecified not elsewhere classified, Blood transfusion, without reported diagnosis, Burn, CHF (congestive heart failure) (HCC), Chronic anticoagulation, Diabetic neuropathy (HCC), Insulin-requiring or dependent type II diabetes mellitus (HCC), Macular degeneration (senile) of retina, Nonischemic cardiomyopathy (HCC), LETITIA (obstructive sleep apnea) (letitia), PAF (paroxysmal atrial fibrillation) (HCC), Renal failure, S/P living-donor kidney transplantation, and Unspecified disorder of thyroid.    Surgical History   has a past surgical history that includes vitrectomy posterior (3/16/2011); cataract phaco with iol (3/16/2011); other; plastic surgery; cholecystectomy; and hysterectomy, total abdominal.     Family History  family history includes Cancer in her mother.   Family history reviewed with patient. There is no family history that is pertinent to the chief complaint.     Social History   reports that she quit smoking about 44 years ago. She has never used smokeless tobacco. She reports that she does not drink alcohol and does not use drugs.    Allergies  No Known Allergies    Medications  Cannot display prior to admission medications because the patient has not been admitted in this contact.       Physical Exam  Temp:  [36.1 °C (96.9 °F)-37.1 °C (98.8 °F)] 36.4 °C (97.6 °F)  Pulse:  [73-96] 79  Resp:  [19-45] 43  BP: (112-151)/(60-93) 112/60  SpO2:  [77 %-100 %] 88 %                          Physical Exam  Constitutional:       Appearance: Normal appearance.   HENT:      Head: Normocephalic and atraumatic.      Mouth/Throat:      Mouth: Mucous membranes are moist.      Pharynx: Oropharynx is clear.   Eyes:      Extraocular Movements: Extraocular movements intact.      Pupils: Pupils are equal, round, and reactive to light.   Cardiovascular:      Rate and Rhythm: Normal rate and regular rhythm.      Heart sounds: Normal heart sounds.   Pulmonary:      Effort:  Pulmonary effort is normal.      Breath sounds: Normal breath sounds.   Abdominal:      General: Abdomen is flat. Bowel sounds are normal.      Palpations: Abdomen is soft.   Musculoskeletal:      Cervical back: Normal range of motion and neck supple.   Skin:     General: Skin is warm and dry.   Neurological:      General: No focal deficit present.      Mental Status: She is alert and oriented to person, place, and time.   Psychiatric:         Mood and Affect: Mood normal.         Behavior: Behavior normal.         Laboratory:  Recent Labs     09/17/21  0656 09/18/21  1240   WBC 5.8 7.4   RBC 4.10* 4.27   HEMOGLOBIN 11.8* 12.4*   HEMATOCRIT 36.6* 39.0   MCV 89.3 91.3   MCH 28.8 29.0   MCHC 32.2* 31.8*   RDW 14.3 14.6*   PLATELETCT 163 134   MPV 9.6 10.2     Recent Labs     09/17/21  0656 09/18/21  0605 09/18/21  1240   SODIUM 146* 146* 144   POTASSIUM 4.7 4.9 5.3*   CHLORIDE 112* 111* 110*   CO2 22 23 22   GLUCOSE 182* 184* 240*   BUN 59* 55* 54*   CREATININE 1.3* 1.4* 1.3*   CALCIUM 7.9* 8.2* 8.2*     Recent Labs     09/17/21  0656 09/18/21  0605 09/18/21  1240   ALTSGPT 70 60 55   ASTSGOT 67* 60* 65*   ALKPHOSPHAT 38* 48 47   TBILIRUBIN 0.3 0.6 0.7   GLUCOSE 182* 184* 240*         Recent Labs     09/17/21  0656   NTPROBNP >70853*         No results for input(s): TROPONINT in the last 72 hours.    Imaging:  No orders to display       Assessment/Plan:  I anticipate this patient will require at least two midnights for appropriate medical management, necessitating inpatient admission.    * Pneumonia due to COVID-19 virus- (present on admission)  Assessment & Plan  -Inpatient status on medical floor  -Positive COVID test: She is coming as a direct admission from [facility].  Covid test positive on 9/10. Symptoms since 9/7 therefore today is day 12 of symptoms.   -Patient is requiring supplemental oxygen: Per report, she was requiring intermittent BiPAP and sent here because there was lack of  availability of RT's at the outside facility.  She is been tolerating 15 L of supplemental oxygen via nasal cannula here and has remained out of BiPAP  -Patient started on dexamethasone 6 mg IV therapy has been completed.  She also received empiric antibiotics and baricinib per discharge summary on outside facility  -Other inflammatory markers: Added  -Continue supportive care     Positive D dimer  Assessment & Plan  -Significantly elevated D-dimer above 20.  She had a VQ scan done on 9/18/2021 showing low probability PE.  She is anticoagulated on Eliquis for underlying A. fib    Paroxysmal atrial fibrillation (HCC)- (present on admission)  Assessment & Plan  -Anticoagulated on Eliquis.    Obstructive sleep apnea- (present on admission)  Assessment & Plan  -Currently on 15 L of supplemental oxygen.  Plan as above.    Hypertension- (present on admission)  Assessment & Plan  -Continue lisinopril.    Hyperlipidemia- (present on admission)  Assessment & Plan  -Continue atorvastatin.    Type 2 diabetes mellitus without complication (HCC)- (present on admission)  Assessment & Plan  -Regular insulin sliding scale.    Chronic anticoagulation- (present on admission)  Assessment & Plan  -On Eliquis.    History of kidney transplant- (present on admission)  Assessment & Plan  -Status post kidney transplant in January 2013.  -immunosuppression with CellCept and tacrolimus that I will continue.  -Avoid nephrotoxic medication      VTE prophylaxis: therapeutic anticoagulation with Eliquis

## 2021-09-19 NOTE — ASSESSMENT & PLAN NOTE
Renal transplant in 1/2013 - on tacrolimus and CellCept.  -Continue home immunosuppressive regimen  -Monitor renal function closely  -Monitor urine output  -Avoid nephrotoxic medication

## 2021-09-19 NOTE — ASSESSMENT & PLAN NOTE
Continue with blood pressure management optimization  Currently hypotensive patient does not want resuscitative efforts

## 2021-09-19 NOTE — ASSESSMENT & PLAN NOTE
2/2 COVID and likely some component of acute on chronic heart failure that is contributing to worsening respiratory status - BNP elevated and prior TTE with EF of 35%.  - lasix 20mg IV BID  - monitor I/Os closely - can increase lasix dosing as needed  - continue supplemental O2 - wean fio2 as tolerated  - COVID therapies as noted above

## 2021-09-19 NOTE — PROGRESS NOTES
Patient arrived to unit around 2345. Assisted to bed and ICU monitoring applied. Patient alert and oriented. Able to follow commands. Positioned in bed for comfort and call light in reach. Patient on 15L NRB mask. Sating high 80s to low 90s. States pain is tolerable for now.

## 2021-09-19 NOTE — ASSESSMENT & PLAN NOTE
-accus with sliding scale coverage  -diabetic diet  -diabetic education  -follow glycohemoglobin levels long term  -monitor for hypoglycemic episodes and adjust control if he should get low

## 2021-09-19 NOTE — PROGRESS NOTES
Pts  (Ed) called back and does not know what medications pt is taking.  Per  reports that pt has a list of medications in her purse

## 2021-09-19 NOTE — PROGRESS NOTES
Patient had sudden episode of confusion followed by sudden and severe oxygen desaturation event to 17% with RN at bedside. RT and MD immediately to bedside. Patient recovered quickly with application of NRB. Patient now awake, alert and oriented. Daughter at bedside.

## 2021-09-19 NOTE — PROGRESS NOTES
Med rec updated and complete  Allergies reviewed  Pt is not sure what medications she is taking, called pts daughter (Maty) @ [phone] to verify all medications.  Pts daughter read pts list of medications to this writer  Per daughter is not sure when she took her medications, pt is not sure how much she injects on her HUMALOG, but follows a sliding scale.  Pt did know that she takes LANTUS 36 units every evening.  Pts daughter reports no antibiotics in the last 30 days.      Current Outpatient Medications on File Prior to Encounter   Medication Sig Dispense Refill   • oxyCODONE immediate release (ROXICODONE) 10 MG immediate release tablet Take 10 mg by mouth 2 times a day as needed for Severe Pain.     • gabapentin (NEURONTIN) 100 MG Cap Take 100 mg by mouth 3 times a day as needed. Indications: Neuropathic Pain     • Multiple Vitamins-Minerals (ICAPS AREDS 2 PO) Take 1 Capsule by mouth 2 times a day.     • Naproxen Sodium (ALEVE) 220 MG Cap Take 220 mg by mouth every day.     • predniSONE (DELTASONE) 5 MG Tab Take 5 mg by mouth every evening.     • Probiotic Product (ALIGN PO) Take 1 Capsule by mouth every day.     • Prenatal Vit-Fe Fumarate-FA (PRENATAL PO) Take 1 Tablet by mouth every evening.     • insulin glargine (LANTUS SOLOSTAR) 100 UNIT/ML Solution Pen-injector injection Inject 36 Units under the skin every evening. 10 Each 3   • levothyroxine (SYNTHROID) 112 MCG Tab TAKE 1 TABLET BY MOUTH EVERY DAY IN THE MORNING ON AN EMPTY STOMACH (Patient taking differently: Take 112 mcg by mouth every morning on an empty stomach.) 90 tablet 2   • Insulin Lispro (HUMALOG KWIKPEN) 200 UNIT/ML Solution Pen-injector Inject 15 Units under the skin 3 times a day. (Patient taking differently: Inject  under the skin 3 times a day. Sliding Scale) 5 Each 3   • ondansetron (ZOFRAN) 4 MG Tab tablet Take 1 tablet by mouth every 8 hours as needed for Nausea/Vomiting. 20 tablet 0   • lidocaine (LIDODERM) 5 % Patch Place 1  "Patch on the skin every 24 hours. (Patient taking differently: Place 1 Patch on the skin every 24 hours as needed (Apply's on back). Per daughter apply's on back) 10 Patch 1   • atorvastatin (LIPITOR) 10 MG Tab TAKE 1 TABLET BY MOUTH EVERY DAY (Patient taking differently: Take 10 mg by mouth every evening.) 90 Tab 2   • cyclobenzaprine (FLEXERIL) 5 mg tablet Take 5 mg by mouth every evening.     • ferrous sulfate 325 (65 Fe) MG tablet Take 325 mg by mouth every day.     • lisinopril (PRINIVIL) 2.5 MG Tab Take 2.5 mg by mouth 2 times a day. Take 1 tablet by mouth two times a day     • apixaban (ELIQUIS) 5mg Tab Take 5 mg by mouth 2 Times a Day.     • Misc. Devices Misc Inulin syringes 30 rodney. Use to inject insulin dx e11.9 100 Syringe 3   • tacrolimus (PROGRAF) 1 MG Cap Take 1 mg by mouth. Takes 2 pill am 1 pills at night     • magnesium oxide (MAG-OX) 400 MG Tab Take 400 mg by mouth 2 times a day.     • Cholecalciferol (VITAMIN D3) 2000 UNIT Cap Take 2,000 Capsules by mouth every day. Takes \"4 in the morning\"     • mycophenolate (CELLCEPT) 250 MG CAPS Take 500 mg by mouth 2 times a day.         "

## 2021-09-19 NOTE — PROGRESS NOTES
Report received. Assumed care of patient. Patient is currently resting calmly in bed on NRB. Patient set up with breakfast. All needs are currently met. Al safety precautions in place. Will continue to monitor.

## 2021-09-19 NOTE — ASSESSMENT & PLAN NOTE
Initially presented with DKA-now resolved.  Her blood sugars are poorly controlled at this time  -Restart home Lantus 35 units subcu every evening  -High insulin sliding scale  -Diabetic diet  -Monitor blood sugars closely

## 2021-09-19 NOTE — ASSESSMENT & PLAN NOTE
Patient has had to complete course of Decadron  Patient at this point cannot continue on antiviral treatment due to renal failure  Continue supportive care

## 2021-09-19 NOTE — CARE PLAN
The patient is Watcher - Medium risk of patient condition declining or worsening    Progress made toward(s) clinical / shift goals: Transitioned to 10L oxymask as patient sating high 90s on NRB. Goal for patient to keep O2 sats greater than 90%.     Patient is not progressing towards the following goals:  Problem: Pain - Standard  Goal: Alleviation of pain or a reduction in pain to the patient’s comfort goal  Outcome: Not Met   Patient c/o chronic back pain. Goal for paint to remain comfortable and sleep when pain is manageable.

## 2021-09-19 NOTE — PROGRESS NOTES
Critical Care Progress Note    Date of admission  9/19/2021    Chief Complaint  70 y.o. female with history of DM2, CAD, history of renal transplant on tacrolimus and CellCept admitted 9/19/2021 with DKA and hypoxic respiratory failure secondary to COVID.  Per records, patient was given Decadron, empiric antibiotics and baricitinib and placed on BiPAP.  She was transferred to [facility] from [facility] on 9/19/2021 due to lack of RT services at [facility].  After transfer patient was able to be weaned off BiPAP onto 15 L nonrebreather without any significant difficulty breathing.    This morning patient states she feels a little bit better.  She is hungry and wants food.  We discussed patient's course of respiratory failure with Covid pneumonia and that she is gradually improving.  Patient stated that if she were to worsen she would not want to be intubated. She understands that if she deteriorated and was not intubated, it would mean that she could die. She does want to be intubated or CPR - code status changed to DNR/DNI.     Interval Problem Update  Reviewed last 24 hour events:    Cardiac: VS stable  Pulm: 15L NRB SpO2 98%  Heme: stable   /renal: Cr improving   ID: doxy, linezolid, zosyn   I/O: -780ml  GI/endo: PO diet  Additional labs/Imaging: BNP >35K, trop 0.65 ->0.43, D-dimer downtrending, CRP downtrending  Lines: PIV    Review of Systems  Review of Systems   Constitutional: Positive for malaise/fatigue.   Respiratory: Positive for shortness of breath.    All other systems reviewed and are negative.       Vital Signs for last 24 hours   Temp:  [36.1 °C (96.9 °F)-37.1 °C (98.8 °F)] 36.1 °C (97 °F)  Pulse:  [73-99] 91  Resp:  [10-45] 22  BP: (112-154)/(60-84) 131/81  SpO2:  [77 %-100 %] 98 %    Hemodynamic parameters for last 24 hours       Respiratory Information for the last 24 hours  P Support: 10    Physical Exam   Physical Exam  Vitals and nursing note reviewed.   Constitutional:        Appearance: Normal appearance. She is normal weight.   HENT:      Head: Normocephalic and atraumatic.      Nose: No congestion.      Mouth/Throat:      Mouth: Mucous membranes are moist.   Eyes:      General: No scleral icterus.     Conjunctiva/sclera: Conjunctivae normal.   Cardiovascular:      Rate and Rhythm: Normal rate.   Pulmonary:      Effort: Pulmonary effort is normal. No respiratory distress.   Abdominal:      General: There is no distension.      Palpations: Abdomen is soft.      Tenderness: There is no abdominal tenderness.   Musculoskeletal:         General: No swelling or deformity.   Skin:     General: Skin is warm and dry.   Neurological:      General: No focal deficit present.      Mental Status: She is alert and oriented to person, place, and time. Mental status is at baseline.   Psychiatric:         Mood and Affect: Mood normal.         Behavior: Behavior normal.         Medications  Current Facility-Administered Medications   Medication Dose Route Frequency Provider Last Rate Last Admin   • apixaban (ELIQUIS) tablet 5 mg  5 mg Oral BID Lashanda Hernandez M.D.       • levothyroxine (SYNTHROID) tablet 112 mcg  112 mcg Oral AM ES Lashanda Hernandez M.D.       • mycophenolate (CELLCEPT) capsule 500 mg  500 mg Oral BID Lashanda Hernandez M.D.   500 mg at 09/19/21 0608   • acetaminophen (Tylenol) tablet 650 mg  650 mg Oral Q6HRS PRN Lashanda Hernandez M.D.       • ondansetron (ZOFRAN) syringe/vial injection 4 mg  4 mg Intravenous Q4HRS PRN Lashanda Hernandez M.D.       • ondansetron (ZOFRAN ODT) dispertab 4 mg  4 mg Oral Q4HRS PRN Lashanda Hernandez M.D.       • senna-docusate (PERICOLACE or SENOKOT S) 8.6-50 MG per tablet 2 Tablet  2 Tablet Oral BID Lashanda Hernandez M.D.        And   • polyethylene glycol/lytes (MIRALAX) PACKET 1 Packet  1 Packet Oral QDAY PRN Lashanda Hernandez M.D.        And   • magnesium hydroxide (MILK OF MAGNESIA) suspension 30 mL   30 mL Oral QDAY PRN Lashanda Hernandez M.D.        And   • bisacodyl (DULCOLAX) suppository 10 mg  10 mg Rectal QDAY PRN Lashanda Hernandez M.D.       • Respiratory Therapy Consult   Nebulization Continuous RT Lashanda Hernandez M.D.       • oxyCODONE-acetaminophen (PERCOCET) 5-325 MG per tablet 2 Tablet  2 Tablet Oral Q6HRS PRN Lashanda Hernandez M.D.   2 Tablet at 09/19/21 0426   • lidocaine (LIDODERM) 5 % 1 Patch  1 Patch Transdermal Q24HR Lashanda Hernandez M.D.   1 Patch at 09/19/21 0426   • Pharmacy Consult Request   Other PHARMACY TO DOSE Lashanda Hernandez M.D.       • furosemide (LASIX) injection 20 mg  20 mg Intravenous BID DIURETIC Aleshia Mcgrath M.D.       • atorvastatin (LIPITOR) tablet 10 mg  10 mg Oral Q EVENING Aleshia Mcgrath M.D.       • insulin glargine (Semglee) injection  35 Units Subcutaneous Q EVENING Aleshia Mcgrath M.D.       • insulin regular (HumuLIN R,NovoLIN R) injection  3-14 Units Subcutaneous 4X/DAY [facility]        And   • glucose 4 g chewable tablet 16 g  16 g Oral Q15 MIN PRN Aleshia Mcgrath M.D.        And   • dextrose 50% (D50W) injection 50 mL  50 mL Intravenous Q15 MIN PRN Aleshia Mcgrath M.D.       • lisinopril (PRINIVIL) tablet 5 mg  5 mg Oral Q DAY Aleshia Mcgrath M.D.       • [START ON 9/20/2021] tacrolimus (PROGRAF) capsule 2 mg  2 mg Oral QAM Aleshia Mcgrath M.D.       • tacrolimus (PROGRAF) capsule 1 mg  1 mg Oral Q EVENING Aleshia Mcgrath M.D.           Fluids    Intake/Output Summary (Last 24 hours) at 9/19/2021 1019  Last data filed at 9/19/2021 0800  Gross per 24 hour   Intake 120 ml   Output 975 ml   Net -855 ml       Laboratory  Recent Labs     09/16/21 2055 09/16/21 2135 09/18/21  1320   [id number] 7.49* 7.48* 7.47*   [id number] 29* 29* 29*   [id number] 28* 47* 72   [id number] <60.0* 84.3* 95.7*   ARTHCO3 21.8 21.0 20.3   [id number] 15* 15*  --    ARTBE -0.4 -1.3 -2.2      Recent Labs     09/17/21  1546 09/18/21  1240   TROPONINI 0.65* 0.43*     Recent Labs     09/17/21  0656 09/17/21  0656 09/18/21  0605 09/18/21  1240 09/19/21  0130   SODIUM 146*   < > 146* 144 135   POTASSIUM 4.7   < > 4.9 5.3* 4.9   CHLORIDE 112*   < > 111* 110* 103   CO2 22   < > 23 22 18*   BUN 59*   < > 55* 54* 54*   CREATININE 1.3*   < > 1.4* 1.3* 1.05   MAGNESIUM 2.5*  --  2.5*  --   --    CALCIUM 7.9*   < > 8.2* 8.2* 8.5    < > = values in this interval not displayed.     Recent Labs     09/17/21  0656 09/17/21  0656 09/18/21  0605 09/18/21  1240 09/19/21  0130   ALTSGPT 70  --  60 55  --    ASTSGOT 67*  --  60* 65*  --    ALKPHOSPHAT 38*  --  48 47  --    TBILIRUBIN 0.3  --  0.6 0.7  --    GLUCOSE 182*   < > 184* 240* 325*    < > = values in this interval not displayed.     Recent Labs     09/17/21  0656 09/18/21  0605 09/18/21  1240 09/19/21  0130   WBC 5.8  --  7.4 5.4   ASTSGOT 67* 60* 65*  --    ALTSGPT 70 60 55  --    ALKPHOSPHAT 38* 48 47  --    TBILIRUBIN 0.3 0.6 0.7  --      Recent Labs     09/17/21  0656 09/18/21  1240 09/19/21  0130   RBC 4.10* 4.27 4.14*   HEMOGLOBIN 11.8* 12.4* 12.2   HEMATOCRIT 36.6* 39.0 38.1   PLATELETCT 163 134 132*       Imaging  reviewed    Assessment/Plan  * Pneumonia due to COVID-19 virus- (present on admission)  Assessment & Plan  Status post dexamethasone and baricitinib.  Inflammatory markers are downtrending. O2 requirements slowly improving.  -Decrease fio2 as tolerated down to simple mask/nasal cannula  -encourage proning  -f/u sputum cultures  -discontinue antibiotics - received a course at OSH and no e/o bacterial infection (neg procal)  -lasix 20mg BID for likely component of fluid overload with elevated BNP      Respiratory failure with hypoxia (HCC)  Assessment & Plan  2/2 COVID and likely some component of acute on chronic heart failure that is contributing to worsening respiratory status - BNP elevated and prior TTE with EF of 35%.  - lasix 20mg IV  BID  - monitor I/Os closely - can increase lasix dosing as needed  - continue supplemental O2 - wean fio2 as tolerated  - COVID therapies as noted above      Positive D dimer  Assessment & Plan  V/Q scan negative for PE.   - Continue Eliquis    Type 2 diabetes mellitus without complication (HCC)- (present on admission)  Assessment & Plan  Initially presented with DKA-now resolved.  Her blood sugars are poorly controlled at this time  -Restart home Lantus 35 units subcu every evening  -High insulin sliding scale  -Diabetic diet  -Monitor blood sugars closely    Paroxysmal atrial fibrillation (HCC)- (present on admission)  Assessment & Plan  -On Eliquis    Hypertension- (present on admission)  Assessment & Plan  Stable  -Change home lisinopril to 5 mg daily    Chronic anticoagulation- (present on admission)  Assessment & Plan  -On Eliquis for A. fib    History of kidney transplant- (present on admission)  Assessment & Plan  Renal transplant in 1/2013 - on tacrolimus and CellCept.  -Continue home immunosuppressive regimen  -Monitor renal function closely  -Monitor urine output  -Avoid nephrotoxic medication        VTE:  NOAC  Ulcer: Not Indicated  Lines: None    I have performed a physical exam and reviewed and updated ROS and Plan today (9/19/2021). In review of yesterday's note (9/18/2021), there are no changes except as documented above.     Discussed patient condition and risk of morbidity and/or mortality with RN, RT and Pharmacy  The patient remains critically ill.  Critical care time = 50 minutes in directly providing and coordinating critical care and extensive data review.  No time overlap and excludes procedures.      Aleshia Mcgrath MD  Pulmonary and Critical Care Medicine  [facility]
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
## 2021-09-20 NOTE — DISCHARGE PLANNING
Anticipated Discharge Disposition:   Home with [employer] oxygen when medically cleared    Action:   Chart review complete.     Per MD, this patient was admitted with COVID pneumonia. Patient is currently on 15 liters via non-rebreather.     Anticipated discharge to home in a few days. [facility]    Barriers to Discharge:   Medical Clearance    Plan:   Hospital care management will continue to assist with discharge planning needs.

## 2021-09-20 NOTE — PROGRESS NOTES
Patient transitioned to HFNC as oxygen saturation is beginning to drop on 15 L NRB. Patient refusing meals at this time.

## 2021-09-21 NOTE — PROGRESS NOTES
0745 Completed morning assessment. Provided pt education on importance of leaving HHF on. Pt maintains saturation above 90%. Pt verbalized understanding but stated it is uncomfortable and she forgets to put it back on. Pt denies any needs at this time, call light within reach.     0900 Pt took HHF off and oxygen dropped. Charge RN in room helping her place it back on. This RN provided education on importance of leaving oxygen on for her safety. Pt verbalized understanding.Pts oxygen currently 93% with HHF on.     0915 Spoke to patients daughter and provided updates.

## 2021-09-21 NOTE — FLOWSHEET NOTE
09/21/21 0635   Events/Summary/Plan   Events/Summary/Plan Removed Oxymask   Vital Signs   Pulse 100   Respiration (!) 24   Pulse Oximetry 98 %   $ Pulse Oximetry (Spot Check) Yes   Oxygen   O2 (LPM) 60   FiO2% 100 %   O2 Delivery Device Heated High Flow Nasal Cannula;Oxymask  (15 lpm oxy)   Aerosols   $ Aerosol Delivery Device Heated High Flow Nasal Cannula   Aerosol Temperature 31 °C (87.8 °F)

## 2021-09-21 NOTE — THERAPY
"Occupational Therapy   Initial Evaluation     Patient Name: Jennifer Menard Senior  Age:  70 y.o., Sex:  female  Medical Record #: [id number]  Today's Date: 9/21/2021     Precautions  Precautions: Fall Risk  Comments: easily fatigues    Assessment  Patient is 70 y.o. female with a diagnosis of COVID-19 PNA. Additional factors influencing patient status / progress: DM2, CHF, A-fib, DKA, h/o kidney transplant. Pt lives with  and reports to be I at [facility],  provides transportation but pt ambulated without AD. She presents today with decreased activity tolerance -- only able to tolerate minimal EOB sitting, reduced balance, decreased safety, generalized weakness, and impaired ADLs. Pt will benefit from acute OT while in house, as well as post acute placement prior to dc home.      Plan    Recommend Occupational Therapy 3 times per week until therapy goals are met for the following treatments:  Adaptive Equipment, Cognitive Skill Development, Community Re-integration, Neuro Re-Education / Balance, Self Care/Activities of Daily Living, Therapeutic Activities and Therapeutic Exercises.    DC Equipment Recommendations: Unable to determine at this time  Discharge Recommendations: Recommend post-acute placement for additional occupational therapy services prior to discharge home     Subjective    \"I can't do that right now. I'm so tired.\"     Objective       09/21/21 1208   Prior Living Situation   Prior Services None   Housing / Facility 1 Story House   Steps Into Home 0   Steps In Home 0   Bathroom Set up Walk In Shower;Shower Glass Doors;Grab Bars   Equipment Owned Grab Bar(s) In Tub / Shower   Lives with - Patient's Self Care Capacity Spouse   Comments pt lives with spouse and was reported to be I at [facility]   Prior Level of ADL Function   Self Feeding Independent   Grooming / Hygiene Independent   Bathing Independent   Dressing Independent   Toileting Independent   Prior Level of IADL Function   Medication " Management Independent   Laundry Independent   Kitchen Mobility Independent   Finances Independent   Home Management Independent   Shopping Independent   Prior Level Of Mobility Independent Without Device in Community;Independent Without Device in Home   Driving / Transportation Relatives / Others Provide Transportation   Occupation (Pre-Hospital Vocational) Retired Due To Age   History of Falls   History of Falls No   Precautions   Precautions Fall Risk   Comments easily fatigues   Pain   Pain Scales 0 to 10 Scale    Intervention Declines   Pain 0 - 10 Group   Pain Rating Scale (NPRS) 0   Therapist Pain Assessment Post Activity Pain Same as Prior to Activity   Non Verbal Descriptors   Non Verbal Scale  Calm;Unlabored Breathing   Cognition    Cognition / Consciousness WDL   Level of Consciousness Alert   Comments requires some encouragement   Active ROM Upper Body   Active ROM Upper Body  X   Gross Active ROM Gross Active Range of Motion Impaired, but Appears Adequate for Functional Activities.   Strength Upper Body   Upper Body Strength  X   Gross Strength Generalized Weakness, Equal Bilaterally.    Balance Assessment   Sitting Balance (Static) Fair -   Sitting Balance (Dynamic) Poor +   Weight Shift Sitting Poor   Bed Mobility    Supine to Sit Moderate Assist   Sit to Supine Minimal Assist   Scooting Minimal Assist   Comments HOB slightly elevated, rails utilized   ADL Assessment   Eating Minimal Assist   Grooming Moderate Assist  (supine)   Lower Body Dressing Moderate Assist   How much help from another person does the patient currently need...   Putting on and taking off regular lower body clothing? 2   Bathing (including washing, rinsing, and drying)? 2   Toileting, which includes using a toilet, bedpan, or urinal? 2   Putting on and taking off regular upper body clothing? 3   Taking care of personal grooming such as brushing teeth? 3   Eating meals? 3   6 Clicks Daily Activity Score 15   Functional Mobility    Mobility supine > sit, EOB, sit > supine only   ICU Target Mobility Level   ICU Mobility - Targeted Level Level 2   Activity Tolerance   Sitting in Chair NT   Sitting Edge of Bed 2-3 mins   Standing NT   Comments limited by weakness and fatigue   Short Term Goals   Short Term Goal # 1 pt will complete and tolerate seated EOB G/H at min A   Short Term Goal # 2 pt will be UIC for meals 3/3   Short Term Goal # 3 pt will complete ADL txfs using LRAD at min A   Education Group   Education Provided Role of Occupational Therapist;Transfers   Role of Occupational Therapist Patient Response Patient;Acceptance;Explanation   Transfers Patient Response Patient;Acceptance;Explanation;Reinforcement Needed   Problem List   Problem List Decreased Active Daily Living Skills;Decreased Homemaking Skills;Decreased Functional Mobility;Decreased Activity Tolerance;Safety Awareness Deficits / Cognition;Impaired Postural Control / Balance   Anticipated Discharge Equipment and Recommendations   DC Equipment Recommendations Unable to determine at this time   Discharge Recommendations Recommend post-acute placement for additional occupational therapy services prior to discharge home   Interdisciplinary Plan of Care Collaboration   IDT Collaboration with  Nursing   Patient Position at End of Therapy In Bed;Call Light within Reach;Tray Table within Reach   Collaboration Comments RN aware of OT session and dc recs   Session Information   Date / Session Number  9/21 #1 (1/3, 9/27)   Priority 2

## 2021-09-21 NOTE — ASSESSMENT & PLAN NOTE
Continue with a high flow nasal cannula at maximum rate and even like this patient's saturations are between 80 and 85%  Patient is not to be intubated  Discussed with family and patient at bedside  Family at this point is indecisive about comfort care versus letting her continue with current treatment

## 2021-09-21 NOTE — PROGRESS NOTES
"Pt continuously removing High flow nasal cannula and oxymask despite frequent education on importance of keeping it on to help oxygenation. Pt states that it is uncomfortable and \"hurts her\". Pt desaturates into the 70's when she removes devices, but recovers into the 90's when placed back on. Pt remains a &o x4, and verbalizes understanding.     RT increased humidification to promote pt comfort  "

## 2021-09-21 NOTE — FLOWSHEET NOTE
09/21/21 1316   Vital Signs   Pulse (!) 104   Respiration (!) 26   Pulse Oximetry 95 %   $ Pulse Oximetry (Spot Check) Yes   Respiratory Assessment   Level of Consciousness Alert   Oxygen   O2 (LPM) 60   FiO2% 100 %   O2 Delivery Device Heated High Flow Nasal Cannula   Aerosols   $ Aerosol Delivery Device Heated High Flow Nasal Cannula   Aerosol Temperature 31 °C (87.8 °F)

## 2021-09-21 NOTE — PROGRESS NOTES
Hospital Medicine Daily Progress Note    Date of Service  9/21/2021    Chief Complaint  Jennifer Ferrell is a 70 y.o. female admitted 9/19/2021 with shortness of breath    Hospital Course  Ms. Ferrell is a 70-year-old female who comes in with shortness of breath.  She has underlying immunocompromise due to renal transplant.  The patient has had both of her dental vaccinations back in April 2021.  In spite of this now she has acquired COVID-19 infection with pneumonia and shortness of breath.  She is with respiratory compromise and is requiring currently high flow by nasal cannula.    Interval Problem Update  Continue with high flow by nasal cannula with a flow of 60 L and FiO2 100%  Continue Decadron  Continue with diabetic management  Nutritional support  Monitor electrolytes  Optimize diabetic management    Patient is critically ill.   The patient is in : Respiratory failure  The vital organ system that is effected is the: Respiratory system  If untreated there is a high chance of deterioration into: death   The critical care that I am providing today is: High flow by nasal cannula  The critical care that has been undertaken is medically complex.   There has been no overlap in critical care time.   Critical care time not including procedures, no overlap: 45 minutes       I have personally seen and examined the patient at bedside. I discussed the plan of care with patient, bedside RN, charge RN,  and pharmacy.    Consultants/Specialty  None    Code Status  DNAR/DNI    Disposition  Patient is not medically cleared.   Anticipate discharge to to skilled nursing facility.  I have placed the appropriate orders for post-discharge needs.    Review of Systems  Review of Systems   Constitutional: Positive for malaise/fatigue. Negative for chills, diaphoresis and fever.   HENT: Negative.    Eyes: Negative.  Negative for double vision.   Respiratory: Positive for cough and shortness of breath. Negative for  hemoptysis and wheezing.    Cardiovascular: Negative.  Negative for chest pain, palpitations and leg swelling.   Gastrointestinal: Positive for heartburn. Negative for abdominal pain, blood in stool, constipation, diarrhea, nausea and vomiting.   Genitourinary: Negative.  Negative for frequency, hematuria and urgency.   Musculoskeletal: Positive for back pain. Negative for joint pain.   Skin: Negative.  Negative for itching and rash.   Neurological: Positive for headaches. Negative for dizziness, focal weakness, seizures and loss of consciousness.   Endo/Heme/Allergies: Negative.  Does not bruise/bleed easily.   Psychiatric/Behavioral: Positive for depression. Negative for memory loss, substance abuse and suicidal ideas. The patient is not nervous/anxious and does not have insomnia.    All other systems reviewed and are negative.       Physical Exam  Temp:  [36.3 °C (97.4 °F)-36.9 °C (98.4 °F)] 36.6 °C (97.8 °F)  Pulse:  [] 100  Resp:  [16-26] 24  BP: ()/(45-83) 112/61  SpO2:  [72 %-100 %] 98 %    Physical Exam  Vitals and nursing note reviewed. Exam conducted with a chaperone present.   Constitutional:       Appearance: Normal appearance. She is well-developed and overweight. She is ill-appearing and diaphoretic.   HENT:      Head: Normocephalic and atraumatic.      Right Ear: External ear normal.      Left Ear: External ear normal.      Nose: Nose normal.      Mouth/Throat:      Mouth: Mucous membranes are dry.      Pharynx: Oropharynx is clear.      Comments: Swelling of the lower lip  Eyes:      Extraocular Movements: Extraocular movements intact.      Conjunctiva/sclera: Conjunctivae normal.      Pupils: Pupils are equal, round, and reactive to light.   Neck:      Thyroid: No thyromegaly.      Vascular: No JVD.   Cardiovascular:      Rate and Rhythm: Tachycardia present. Rhythm irregular.      Heart sounds: Murmur heard.     Pulmonary:      Breath sounds: Wheezing and rales present.   Chest:       Chest wall: No tenderness.   Abdominal:      General: Abdomen is flat. Bowel sounds are normal. There is no distension.      Palpations: There is no mass.      Tenderness: There is no abdominal tenderness. There is no guarding or rebound.   Musculoskeletal:         General: Normal range of motion.      Cervical back: Normal range of motion and neck supple. No tenderness.      Right lower leg: No edema.      Left lower leg: No edema.   Lymphadenopathy:      Cervical: No cervical adenopathy.   Skin:     General: Skin is warm.      Capillary Refill: Capillary refill takes less than 2 seconds.      Findings: No rash.   Neurological:      General: No focal deficit present.      Mental Status: She is alert and oriented to person, place, and time. Mental status is at baseline.      Cranial Nerves: No cranial nerve deficit.      Deep Tendon Reflexes: Reflexes are normal and symmetric.   Psychiatric:         Attention and Perception: Attention normal.         Mood and Affect: Mood normal. Affect is flat.         Speech: Speech is delayed.         Behavior: Behavior is slowed.         Thought Content: Thought content normal.         Cognition and Memory: Cognition and memory normal.         Judgment: Judgment normal.         Fluids    Intake/Output Summary (Last 24 hours) at 9/21/2021 1023  Last data filed at 9/21/2021 1000  Gross per 24 hour   Intake 430 ml   Output 975 ml   Net -545 ml       Laboratory  Recent Labs     09/18/21  1240 09/19/21  0130   WBC 7.4 5.4   RBC 4.27 4.14*   HEMOGLOBIN 12.4* 12.2   HEMATOCRIT 39.0 38.1   MCV 91.3 92.0   MCH 29.0 29.5   MCHC 31.8* 32.0*   RDW 14.6* 48.3   PLATELETCT 134 132*   MPV 10.2 10.5     Recent Labs     09/18/21  1240 09/19/21  0130 09/21/21  0320   SODIUM 144 135 142   POTASSIUM 5.3* 4.9 5.2   CHLORIDE 110* 103 108   CO2 22 18* 19*   GLUCOSE 240* 325* 137*   BUN 54* 54* 48*   CREATININE 1.3* 1.05 1.00   CALCIUM 8.2* 8.5 8.7                   Imaging  No orders to display         Assessment/Plan  * Pneumonia due to COVID-19 virus- (present on admission)  Assessment & Plan  In spite of having the Materna vaccine the patient has developed COVID-19 infection.  She is immunocompromise due to having a renal transplant and being on chronic immunosuppressive therapy.    Respiratory failure with hypoxia (HCC)  Assessment & Plan  Currently patient is requiring high flow by nasal cannula at 100% and 60 L  Continue RT protocol    Positive D dimer  Assessment & Plan  Patient is on chronic Eliquis for atrial fibrillation  D-dimer was elevated  VQ scan is -09 18    Type 2 diabetes mellitus without complication (HCC)- (present on admission)  Assessment & Plan  Currently blood sugars are well controlled.    Paroxysmal atrial fibrillation (HCC)- (present on admission)  Assessment & Plan  Continue with Eliquis    Hypertension- (present on admission)  Assessment & Plan  Optimize blood pressure management to keep systolic blood pressure less than 140 diastolic under 90, currently hypotensive so hold antihypertensive management, at home she was on lisinopril    Obstructive sleep apnea- (present on admission)  Assessment & Plan  Continue high flow by nasal cannula for now    Hyperlipidemia- (present on admission)  Assessment & Plan  Low-fat low-cholesterol diet  Statin  Fasting lipid panel will need to be repeated for more current value.  Lab Results   Component Value Date/Time    CHOLSTRLTOT 141 12/31/2020 10:16 AM    LDL 46 12/31/2020 10:16 AM    HDL 81.0 (H) 12/31/2020 10:16 AM    TRIGLYCERIDE 69 12/31/2020 10:16 AM             Chronic anticoagulation- (present on admission)  Assessment & Plan  Continue chronic Eliquis use    History of kidney transplant- (present on admission)  Assessment & Plan  Continue immunosuppression with CellCept and tacrolimus       VTE prophylaxis: therapeutic anticoagulation with Eliquis    I have performed a physical exam and reviewed and updated ROS and Plan today (9/21/2021).  In review of yesterday's note (9/20/2021), there are no changes except as documented above.

## 2021-09-21 NOTE — FLOWSHEET NOTE
09/21/21 1024   Vital Signs   Pulse (!) 102   Respiration (!) 26   Pulse Oximetry 91 %   $ Pulse Oximetry (Spot Check) Yes   Oxygen   O2 (LPM) 60   FiO2% 100 %   O2 Delivery Device Heated High Flow Nasal Cannula   Aerosols   $ Aerosol Delivery Device Heated High Flow Nasal Cannula   Aerosol Temperature 31 °C (87.8 °F)

## 2021-09-22 NOTE — FLOWSHEET NOTE
09/22/21 1429   Events/Summary/Plan   Events/Summary/Plan Oxymask removed.   Vital Signs   Pulse (!) 101   Respiration (!) 30   Pulse Oximetry 97 %   $ Pulse Oximetry (Spot Check) Yes   Respiratory Assessment   Level of Consciousness Alert   Chest Exam   Work Of Breathing / Effort Mild;Shallow;Tachypnea   Oxygen   O2 (LPM) 60   FiO2% 100 %   O2 Delivery Device Heated High Flow Nasal Cannula;Oxymask   Aerosols   $ Aerosol Delivery Device Heated High Flow Nasal Cannula   Aerosol Temperature 31 °C (87.8 °F)

## 2021-09-22 NOTE — PROGRESS NOTES
[phone] - report from Damien FULLER.  Pt resting comfortably in bed.  A&O to self place situation.  ANDERS, repositions self in bed.  Fall precautions in effect.  Pt calls approp for asst at times.  Pt c/o back pain, see mar for med intervention.  C/o ongoing sob, O2 via HFNC ongoing. Denies dizziness, nausea. See flowsheet for assess.  poc reviewed with pt, pt vu, all questions answered, pt will need reinforcement of all teaching. Vss.    Poor po intake with am meal, edson diet well.      Crisis charting in effect.

## 2021-09-22 NOTE — RESPIRATORY CARE
At 1800 HHFNC check, patient found the Oxymask ON with FLUSHED flow with an SPO2 at 100%    Patient is sleeping, left Oxymask ON and gradually weaned flow to 0 LPM, with HHFNC applied at 60LPM and 100%.  Patient remains with an SPO2 98% after 5 mins of observation.

## 2021-09-22 NOTE — DISCHARGE PLANNING
Anticipated Discharge Disposition:   TBD, possibly home with [facility] oxygen when medically cleared     Action:   Chart review complete.     Discussed patient's plan of care and plans for discharge during rounds. Per MD, this patient is now maxed out on HFNC. Patient is a DNAR/DNI. Remdesivir started.     Depending on patient's medical progress, patient may be an LTAC candidate.     RN CM will continue to follow.     Barriers to Discharge:   Medical Clearance  Possible LTAC     Plan:   Hospital care management will continue to assist with discharge planning needs.

## 2021-09-22 NOTE — FLOWSHEET NOTE
09/22/21 0645   Vital Signs   Pulse (!) 102   Respiration (!) 32   Pulse Oximetry 90 %   $ Pulse Oximetry (Spot Check) Yes   Respiratory Assessment   Level of Consciousness Alert   Oxygen   O2 (LPM) 60   FiO2% 100 %   O2 Delivery Device Heated High Flow Nasal Cannula;Oxymask  (15 lpm)   Aerosols   $ Aerosol Delivery Device Heated High Flow Nasal Cannula   Aerosol Temperature 31 °C (87.8 °F)

## 2021-09-22 NOTE — FLOWSHEET NOTE
09/22/21 1038   Vital Signs   Pulse 99   Respiration (!) 26   Pulse Oximetry 89 %   $ Pulse Oximetry (Spot Check) Yes   Respiratory Assessment   Level of Consciousness Alert   Oxygen   O2 (LPM) 60   FiO2% 100 %   O2 Delivery Device Heated High Flow Nasal Cannula;Oxymask   Aerosols   $ Aerosol Delivery Device Heated High Flow Nasal Cannula   Aerosol Temperature 31 °C (87.8 °F)

## 2021-09-22 NOTE — PROGRESS NOTES
Brief ID note:    -Remdesivir approval requested by Dr. Ascencio  -Chart reviewed.  Patient status post renal transplant, immunocompromised, admitted with COVID-19 pneumonia with progressive oxygen requirements, currently on HFNC  -Patient is already on anticoagulation with apixaban  -Start dexamethasone  -Remdesivir approved. Will initiate a 5-day course of IV remdesivir  -Monitor daily CMP. Discontinue remdesivir if ALT >500 or if creatinine clearance <30  -Continue management per hospitalist teams. Please call us back if formal consult requested

## 2021-09-22 NOTE — FLOWSHEET NOTE
09/22/21 1640   Events/Summary/Plan   Events/Summary/Plan FiO2 titrated to 80%.   Vital Signs   Pulse 96   Respiration (!) 26   Pulse Oximetry 100 %   $ Pulse Oximetry (Spot Check) Yes   Respiratory Assessment   Level of Consciousness Alert   Oxygen   O2 (LPM) 60   FiO2% 100 %  (titrated to 80%)   O2 Delivery Device Heated High Flow Nasal Cannula

## 2021-09-22 NOTE — FLOWSHEET NOTE
09/22/21 1446   Vital Signs   Pulse 98   Pulse Oximetry 96 %   $ Pulse Oximetry (Spot Check) Yes   Oxygen   O2 (LPM) 60   FiO2% 100 %   O2 Delivery Device Heated High Flow Nasal Cannula

## 2021-09-23 PROBLEM — N17.9 ARF (ACUTE RENAL FAILURE) (HCC): Status: ACTIVE | Noted: 2021-01-01

## 2021-09-23 NOTE — DISCHARGE PLANNING
Anticipated Discharge Disposition:   TBD, possibly home with hospice    Action:   Chart review complete.     Discussed patient's plan of care and plans for discharge during rounds. Per MD, family meeting was had this morning to discuss goals of care. Family is considering comfort care and hospice but no final decisions have been made at this time.     RN CM will continue to follow and will collect hospice choice if order placed.     Barriers to Discharge:   Medical Clearance  Possible CC/hospice    Plan:   Hospital care management will continue to assist with discharge planning needs.

## 2021-09-23 NOTE — THERAPY
Missed Therapy     Patient Name: Jennifer Menard Senior  Age:  70 y.o., Sex:  female  Medical Record #: [id number]  Today's Date: 9/23/2021    Discussed missed therapy with RN       09/23/21 0703   Interdisciplinary Plan of Care Collaboration   IDT Collaboration with  Nursing   Patient Position at End of Therapy In Bed   Collaboration Comments Attempted therapy evaluation, however, RN advising to hold as pt is not medically appropriate for therapy services. Will hold at this time and re-attempt if appropriate.

## 2021-09-23 NOTE — PROGRESS NOTES
Hospital Medicine Daily Progress Note    Date of Service  9/23/2021    Chief Complaint  Jennifer Ferrell is a 70 y.o. female admitted 9/19/2021 with shortness of breath    Hospital Course  9/21/2021-Ms. Ferrell is a 70-year-old female who comes in with shortness of breath.  She has underlying immunocompromise due to renal transplant.  The patient has had both of her dental vaccinations back in April 2021.  In spite of this now she has acquired COVID-19 infection with pneumonia and shortness of breath.  She is with respiratory compromise and is requiring currently high flow by nasal cannula.    9/22/2021-today patient appears more confused and is more hypoxic.  In spite of being on maximum support the patient continues to be hypoxic.  Patient is DO NOT RESUSCITATE DO NOT INTUBATE this at this point she cannot go on mechanical ventilation, possible BiPAP versus CPAP may be available however patient at this point is better suited with high flow by nasal cannula.  Spoke with ID today and the patient at this point will be started on remdesivir.    9/23/2021-today patient is more distressed.  She is now hypotensive, renal functions have worsened, respiratory status worsened.  She does not wish to continue with her treatment.  Family called and a within an hour they came and I was able to meet with them at bedside.  At this point family and patient are deciding whether she wants to go comfort care and have her pain addressed as with the current blood pressure I am unable to provide additional narcotics for her      Interval Problem Update  Family to decide with the patient about the future of her care plan  Possible comfort care  Currently due to hypotension unable to provide additional narcotics  Continue maximum respiratory support  At this point unable to provide further antiviral treatment due to worsening renal failure  Very poor prognosis and poor condition overall    Patient is critically ill.   The patient is  in : Respiratory failure  The vital organ system that is effected is the: Respiratory system  If untreated there is a high chance of deterioration into: death   The critical care that I am providing today is: High flow by nasal cannula  The critical care that has been undertaken is medically complex.   There has been no overlap in critical care time.   Critical care time not including procedures, no overlap: 55 minutes       I have personally seen and examined the patient at bedside. I discussed the plan of care with patient, bedside RN, charge RN,  and pharmacy.    Consultants/Specialty  None    Code Status  DNAR/DNI    Disposition  Patient is not medically cleared.   Anticipate discharge to to skilled nursing facility.  I have placed the appropriate orders for post-discharge needs.    Review of Systems  Review of Systems   Constitutional: Positive for malaise/fatigue. Negative for weight loss.   HENT: Negative.  Negative for congestion and hearing loss.    Eyes: Negative.  Negative for blurred vision and double vision.   Respiratory: Positive for cough and shortness of breath. Negative for sputum production and stridor.    Cardiovascular: Positive for chest pain. Negative for orthopnea and claudication.   Gastrointestinal: Negative.  Negative for heartburn, nausea and vomiting.   Genitourinary: Negative.  Negative for dysuria, frequency and urgency.   Musculoskeletal: Negative.  Negative for back pain and joint pain.   Skin: Negative.  Negative for itching and rash.   Neurological: Negative.  Negative for tingling, tremors, sensory change and headaches.   Endo/Heme/Allergies: Negative.    Psychiatric/Behavioral: Negative for depression, substance abuse and suicidal ideas. The patient is nervous/anxious and has insomnia.    All other systems reviewed and are negative.       Physical Exam  Temp:  [35.9 °C (96.6 °F)-36.6 °C (97.8 °F)] 35.9 °C (96.6 °F)  Pulse:  [] 100  Resp:  [19-30] 20  BP:  ()/(45-65) 80/45  SpO2:  [81 %-100 %] 83 %    Physical Exam  Vitals and nursing note reviewed. Exam conducted with a chaperone present.   Constitutional:       Appearance: Normal appearance. She is well-developed and normal weight.   HENT:      Head: Normocephalic and atraumatic.      Right Ear: There is no impacted cerumen.      Left Ear: There is no impacted cerumen.      Nose: Nose normal. No congestion or rhinorrhea.      Mouth/Throat:      Mouth: Mucous membranes are dry.      Pharynx: Oropharynx is clear.   Eyes:      Extraocular Movements: Extraocular movements intact.      Conjunctiva/sclera: Conjunctivae normal.      Pupils: Pupils are equal, round, and reactive to light.   Neck:      Thyroid: No thyroid mass.      Vascular: No carotid bruit or JVD.   Cardiovascular:      Rate and Rhythm: Regular rhythm. Tachycardia present.      Pulses: Normal pulses.      Heart sounds: Normal heart sounds, S1 normal and S2 normal.   Pulmonary:      Effort: Tachypnea, accessory muscle usage, prolonged expiration and respiratory distress present.      Breath sounds: Decreased air movement present. Examination of the right-upper field reveals decreased breath sounds and rhonchi. Examination of the left-upper field reveals decreased breath sounds and rhonchi. Examination of the right-middle field reveals decreased breath sounds and rhonchi. Examination of the left-middle field reveals decreased breath sounds and rhonchi. Examination of the right-lower field reveals decreased breath sounds and rhonchi. Examination of the left-lower field reveals decreased breath sounds and rhonchi. Decreased breath sounds and rhonchi present.   Abdominal:      General: Abdomen is flat. Bowel sounds are normal. There is no distension.      Palpations: Abdomen is soft. There is no mass.      Tenderness: There is no abdominal tenderness. There is no guarding or rebound.      Hernia: No hernia is present.   Musculoskeletal:         General:  Normal range of motion.      Cervical back: Normal range of motion and neck supple. No edema or rigidity.      Right lower leg: No edema.      Left lower leg: No edema.      Right foot: Normal range of motion. No deformity or foot drop.      Left foot: Normal range of motion. No deformity or foot drop.   Feet:      Right foot:      Skin integrity: Skin integrity normal. No ulcer.      Left foot:      Skin integrity: Skin integrity normal. No ulcer.   Lymphadenopathy:      Head:      Right side of head: No submental adenopathy.      Left side of head: No submental adenopathy.      Cervical: No cervical adenopathy.      Right cervical: No superficial cervical adenopathy.     Left cervical: No superficial cervical adenopathy.      Upper Body:      Right upper body: No supraclavicular adenopathy.      Left upper body: No supraclavicular adenopathy.      Lower Body: No right inguinal adenopathy. No left inguinal adenopathy.   Skin:     General: Skin is warm.      Capillary Refill: Capillary refill takes less than 2 seconds.      Findings: No abrasion or wound.   Neurological:      Mental Status: She is alert. Mental status is at baseline. She is disoriented.      GCS: GCS eye subscore is 4. GCS verbal subscore is 5. GCS motor subscore is 6.      Cranial Nerves: Cranial nerves are intact.      Sensory: Sensation is intact.      Motor: Motor function is intact. No weakness.      Coordination: Coordination is intact.   Psychiatric:         Mood and Affect: Mood is anxious. Affect is flat.         Speech: Speech is delayed and slurred.         Behavior: Behavior is uncooperative.         Thought Content: Thought content normal.         Judgment: Judgment normal.         Fluids    Intake/Output Summary (Last 24 hours) at 9/23/2021 1247  Last data filed at 9/23/2021 0900  Gross per 24 hour   Intake 710 ml   Output 265 ml   Net 445 ml       Laboratory      Recent Labs     09/21/21  0320 09/22/21  1620 09/23/21  0445   SODIUM  142 137 138   POTASSIUM 5.2 4.4 4.0   CHLORIDE 108 107 102   CO2 19* 14* 20   GLUCOSE 137* 162* 55*   BUN 48* 51* 61*   CREATININE 1.00 1.21 1.70*   CALCIUM 8.7 7.3* 8.5                   Imaging  No orders to display        Assessment/Plan  * Pneumonia due to COVID-19 virus- (present on admission)  Assessment & Plan  Patient has had to complete course of Decadron  Patient at this point cannot continue on antiviral treatment due to renal failure  Continue supportive care    ARF (acute renal failure) (HCC)  Assessment & Plan  Continue with monitoring renal functions and avoid nephrotoxic medications    Respiratory failure with hypoxia (HCC)  Assessment & Plan  Continue with a high flow nasal cannula at maximum rate and even like this patient's saturations are between 80 and 85%  Patient is not to be intubated  Discussed with family and patient at bedside  Family at this point is indecisive about comfort care versus letting her continue with current treatment      Positive D dimer  Assessment & Plan  Negative VQ scan    Type 2 diabetes mellitus without complication (HCC)- (present on admission)  Assessment & Plan  -accus with sliding scale coverage  -diabetic diet  -diabetic education  -follow glycohemoglobin levels long term  -monitor for hypoglycemic episodes and adjust control if he should get low    Paroxysmal atrial fibrillation (HCC)- (present on admission)  Assessment & Plan  Optimize Eliquis  Optimize rate control    Hypertension- (present on admission)  Assessment & Plan  Continue with blood pressure management optimization  Currently hypotensive patient does not want resuscitative efforts    Obstructive sleep apnea- (present on admission)  Assessment & Plan  Remains on high flow by nasal cannula    Hyperlipidemia- (present on admission)  Assessment & Plan  Low-fat low-cholesterol diet  Statin  Fasting lipid panel will need to be repeated for more current value.  Lab Results   Component Value Date/Time     CHOLSTRLTOT 141 12/31/2020 10:16 AM    LDL 46 12/31/2020 10:16 AM    HDL 81.0 (H) 12/31/2020 10:16 AM    TRIGLYCERIDE 69 12/31/2020 10:16 AM             Chronic anticoagulation- (present on admission)  Assessment & Plan  Optimized on Eliquis    History of kidney transplant- (present on admission)  Assessment & Plan  Continue with tacrolimus and CellCept       VTE prophylaxis: therapeutic anticoagulation with Eliquis    I have performed a physical exam and reviewed and updated ROS and Plan today (9/23/2021). In review of yesterday's note (9/22/2021), there are no changes except as documented above.

## 2021-09-23 NOTE — FLOWSHEET NOTE
09/23/21 1100   Events/Summary/Plan   Skin Inspection Respiratory Device Intact   Vital Signs   Pulse 100   Respiration 20   Pulse Oximetry (!) 83 %   $ Pulse Oximetry (Spot Check) Yes   Oxygen   O2 (LPM) 60   FiO2% 100 %   O2 Delivery Device Heated High Flow Nasal Cannula   Aerosols   $ Aerosol Delivery Device Heated High Flow Nasal Cannula   Aerosol Temperature 37 °C (98.6 °F)   Equipment Change Date 10/21/21

## 2021-09-23 NOTE — PROGRESS NOTES
Assumed pt care. AAOx3. Pt denied pain or SOB at rest. Assessment completed. Pt denied any needs. Reminded pt to call for assistance. Call light within reach, bed in lowest position, bed alarm on, will continue to monitor.

## 2021-09-23 NOTE — FLOWSHEET NOTE
09/22/21 1729   Vital Signs   Pulse (!) 102   Pulse Oximetry 98 %   $ Pulse Oximetry (Spot Check) Yes   Oxygen   O2 (LPM) 60   FiO2% 80 %   O2 Delivery Device Heated High Flow Nasal Cannula

## 2021-09-24 NOTE — DISCHARGE SUMMARY
Death Summary    Cause of Death  Acute hypoxic respiratory failure due to COVID-19 pneumonia due to immunocompromise status with renal transplant and chronic immunosuppression due to unknown source.    Comorbid Conditions at the Time of Death  Principal Problem:    Pneumonia due to COVID-19 virus POA: Yes  Active Problems:    Respiratory failure with hypoxia (HCC) POA: Unknown    ARF (acute renal failure) (HCC) POA: Unknown    Hypertension POA: Yes    Paroxysmal atrial fibrillation (HCC) POA: Yes    Type 2 diabetes mellitus without complication (HCC) POA: Yes      Overview: TYPE 2 DIABETES MELLITUS WITHOUT COMPLICATIONS            Formatting of this note might be different from the original.      TYPE 2 DIABETES MELLITUS WITHOUT COMPLICATIONS      Formatting of this note might be different from the original.      Diabetes mellitus type 2 diagnosed around the age of 53.  She has been on       insulin therapy throughout.  She has retinopathy and neuropathy.    Positive D dimer POA: Unknown    History of kidney transplant POA: Yes      Overview: Formatting of this note might be different from the original.      [facility] kidney transplant 1/17/2013      Formatting of this note might be different from the original.      [facility] kidney transplant 1/17/2013    Chronic anticoagulation POA: Yes    Hyperlipidemia POA: Yes    Obstructive sleep apnea POA: Yes      Overview: Questionable history of obstructive sleep apnea and for unclear reasons       she has been placed on nightly oxygen.  She is certain that she is not on       CPAP.            Formatting of this note might be different from the original.      Questionable history of obstructive sleep apnea and for unclear reasons       she has been placed on nightly oxygen.  She is certain that she is not on       CPAP.      Formatting of this note might be different from the original.      Questionable history of obstructive sleep apnea and for unclear reasons       she has been  placed on nightly oxygen.  She is certain that she is not on       CPAP.  Resolved Problems:    * No resolved hospital problems. *      History of Presenting Illness and Hospital Course  Ms. Ferrell is a 70-year-old female who is a very pleasant lady that unfortunately had renal transplant from a living donor. She had renal failure secondary to diabetes. The patient thus was on chronic immunosuppression with CellCept and tacrolimus. She was vaccinated against COVID-19 in spite of this due to her immune compromised status she acquired a COVID-19 infection. The patient was initially seen at an outside facility where she was given a 10-day course of Decadron as well as antiviral treatment. In spite of this she was not getting better so she was transferred to [facility] for higher level of care. She continued to decline with her respiratory status and had to be maximized on high flow by nasal cannula. She elected to be DO NOT RESUSCITATE DO NOT INTUBATE and on repeat questioning did not want to be placed on a mechanical ventilator or be intubated. The patient however was having tremendous amount of pain and her blood pressure however continued to decline. Because of the hypotension we were unable to give her adequate pain support and thus her pain was not able to be well controlled. She at that point asked that the treatment be withdrawn and the pain to be controlled and for her to allow to succumb to the illness in a natural process. I then contacted family relaying this information to them and immediately came for a evaluation and discussion at bedside. I was able to discuss with the family as well as the patient in a very reasonable manner to situation they then asked for a little bit of time to make a decision. The initial meeting was held about 10 AM. The patient's family as well as the patient herself made a decision around 4:00 in the afternoon to go on comfort care. Comfort care measures were instituted and the  patient passed away at 1730.    Death Date: 09/23/21   Death Time: 1730         Pronounced By (RN1): Natalie Lerma RN  Pronounced By (RN2): Donato Dorsey RN

## 2021-10-21 NOTE — DOCUMENTATION QUERY
[facility]                                                                       Query Response Note      PATIENT:               FELICIA BATISTA  ACCT #:                  [id number]  MRN:                     [id number]  :                      1950  ADMIT DATE:       2021 12:17 AM  DISCH DATE:        2021 5:30 PM  RESPONDING  PROVIDER #:        [id number]           QUERY TEXT:    Encephalopathy is documented in the progress note 21. Please specify type     NOTE:  If an appropriate response is not listed below, please respond with a new note.    The patient's Clinical Indicators include:  - Findings:  Progress note 21:  encephalopathy is getting worse     - Treatments:  02, chest x ray, decadron, antibiotics, baricitinib, comfort care     - Risk factors:  COVID 19, pneumonia, LETITIA, acute respiratory failure with hypoxia, kidney transplant status, immunodeficiency     Thank You,  Chasidy Thorpe RN  Clinical Documentation   [email or web address]  Connect via [email or web address] Messenger  Options provided:   -- Anoxic encephalopathy   -- Due to medications or drugs   -- Metabolic encephalopathy   -- Other type of encephalopathy   -- Unable to determine      Query created by: Chasidy Thorpe on 10/20/2021 2:24 PM    RESPONSE TEXT:    Metabolic encephalopathy          Electronically signed by:  YARON STOUT MD 10/21/2021 7:40 AM